# Patient Record
Sex: FEMALE | Race: WHITE | NOT HISPANIC OR LATINO
[De-identification: names, ages, dates, MRNs, and addresses within clinical notes are randomized per-mention and may not be internally consistent; named-entity substitution may affect disease eponyms.]

---

## 2017-01-10 ENCOUNTER — TRANSCRIPTION ENCOUNTER (OUTPATIENT)
Age: 37
End: 2017-01-10

## 2017-03-15 ENCOUNTER — TRANSCRIPTION ENCOUNTER (OUTPATIENT)
Age: 37
End: 2017-03-15

## 2017-04-24 ENCOUNTER — APPOINTMENT (OUTPATIENT)
Dept: FAMILY MEDICINE | Facility: CLINIC | Age: 37
End: 2017-04-24

## 2017-05-11 ENCOUNTER — NON-APPOINTMENT (OUTPATIENT)
Age: 37
End: 2017-05-11

## 2017-05-11 ENCOUNTER — LABORATORY RESULT (OUTPATIENT)
Age: 37
End: 2017-05-11

## 2017-05-11 ENCOUNTER — APPOINTMENT (OUTPATIENT)
Dept: FAMILY MEDICINE | Facility: CLINIC | Age: 37
End: 2017-05-11

## 2017-05-11 VITALS
BODY MASS INDEX: 16.93 KG/M2 | TEMPERATURE: 99.1 F | OXYGEN SATURATION: 99 % | HEIGHT: 59 IN | SYSTOLIC BLOOD PRESSURE: 112 MMHG | WEIGHT: 84 LBS | HEART RATE: 76 BPM | DIASTOLIC BLOOD PRESSURE: 70 MMHG

## 2017-05-11 DIAGNOSIS — I73.00 RAYNAUD'S SYNDROME W/OUT GANGRENE: ICD-10-CM

## 2017-05-11 DIAGNOSIS — R00.2 PALPITATIONS: ICD-10-CM

## 2017-05-11 DIAGNOSIS — R63.6 UNDERWEIGHT: ICD-10-CM

## 2017-05-11 DIAGNOSIS — Z87.19 PERSONAL HISTORY OF OTHER DISEASES OF THE DIGESTIVE SYSTEM: ICD-10-CM

## 2017-05-15 LAB
ALBUMIN SERPL ELPH-MCNC: 4.9 G/DL
ALP BLD-CCNC: 54 U/L
ALT SERPL-CCNC: 13 U/L
ANION GAP SERPL CALC-SCNC: 21 MMOL/L
AST SERPL-CCNC: 24 U/L
BASOPHILS # BLD AUTO: 0.04 K/UL
BASOPHILS NFR BLD AUTO: 0.6 %
BILIRUB SERPL-MCNC: 0.3 MG/DL
BUN SERPL-MCNC: 14 MG/DL
CALCIUM SERPL-MCNC: 9.6 MG/DL
CHLORIDE SERPL-SCNC: 104 MMOL/L
CO2 SERPL-SCNC: 18 MMOL/L
CREAT SERPL-MCNC: 0.68 MG/DL
EOSINOPHIL # BLD AUTO: 0.12 K/UL
EOSINOPHIL NFR BLD AUTO: 1.8 %
GLUCOSE SERPL-MCNC: 78 MG/DL
HCT VFR BLD CALC: 37.6 %
HGB BLD-MCNC: 11.7 G/DL
IMM GRANULOCYTES NFR BLD AUTO: 0.1 %
LYMPHOCYTES # BLD AUTO: 2.09 K/UL
LYMPHOCYTES NFR BLD AUTO: 30.7 %
MAN DIFF?: NORMAL
MCHC RBC-ENTMCNC: 25.2 PG
MCHC RBC-ENTMCNC: 31.1 GM/DL
MCV RBC AUTO: 80.9 FL
MONOCYTES # BLD AUTO: 0.56 K/UL
MONOCYTES NFR BLD AUTO: 8.2 %
NEUTROPHILS # BLD AUTO: 3.99 K/UL
NEUTROPHILS NFR BLD AUTO: 58.6 %
PLATELET # BLD AUTO: 406 K/UL
POTASSIUM SERPL-SCNC: 4.1 MMOL/L
PROT SERPL-MCNC: 7.9 G/DL
RBC # BLD: 4.65 M/UL
RBC # FLD: 17 %
SODIUM SERPL-SCNC: 143 MMOL/L
WBC # FLD AUTO: 6.81 K/UL

## 2017-05-23 PROBLEM — I73.00 RAYNAUD'S SYNDROME: Status: ACTIVE | Noted: 2017-05-23

## 2017-11-27 ENCOUNTER — EMERGENCY (EMERGENCY)
Facility: HOSPITAL | Age: 37
LOS: 0 days | Discharge: HOME | End: 2017-11-27
Attending: EMERGENCY MEDICINE | Admitting: EMERGENCY MEDICINE
Payer: COMMERCIAL

## 2017-11-27 VITALS
RESPIRATION RATE: 18 BRPM | HEART RATE: 87 BPM | OXYGEN SATURATION: 100 % | DIASTOLIC BLOOD PRESSURE: 65 MMHG | SYSTOLIC BLOOD PRESSURE: 121 MMHG | TEMPERATURE: 98 F

## 2017-11-27 VITALS
SYSTOLIC BLOOD PRESSURE: 104 MMHG | DIASTOLIC BLOOD PRESSURE: 64 MMHG | RESPIRATION RATE: 16 BRPM | OXYGEN SATURATION: 100 % | TEMPERATURE: 98 F | HEART RATE: 83 BPM

## 2017-11-27 DIAGNOSIS — M54.2 CERVICALGIA: ICD-10-CM

## 2017-11-27 DIAGNOSIS — R11.2 NAUSEA WITH VOMITING, UNSPECIFIED: ICD-10-CM

## 2017-11-27 DIAGNOSIS — R00.2 PALPITATIONS: ICD-10-CM

## 2017-11-27 LAB
ALBUMIN SERPL ELPH-MCNC: 4.2 G/DL — SIGNIFICANT CHANGE UP (ref 3.3–5)
ALP SERPL-CCNC: 50 U/L — SIGNIFICANT CHANGE UP (ref 40–120)
ALT FLD-CCNC: 23 U/L — SIGNIFICANT CHANGE UP (ref 12–78)
ANION GAP SERPL CALC-SCNC: 11 MMOL/L — SIGNIFICANT CHANGE UP (ref 5–17)
APPEARANCE UR: CLEAR — SIGNIFICANT CHANGE UP
AST SERPL-CCNC: 13 U/L — LOW (ref 15–37)
BACTERIA # UR AUTO: (no result)
BASOPHILS # BLD AUTO: 0.1 K/UL — SIGNIFICANT CHANGE UP (ref 0–0.2)
BASOPHILS NFR BLD AUTO: 0.7 % — SIGNIFICANT CHANGE UP (ref 0–2)
BILIRUB SERPL-MCNC: 0.4 MG/DL — SIGNIFICANT CHANGE UP (ref 0.2–1.2)
BILIRUB UR-MCNC: NEGATIVE — SIGNIFICANT CHANGE UP
BUN SERPL-MCNC: 6 MG/DL — LOW (ref 7–23)
CALCIUM SERPL-MCNC: 9.2 MG/DL — SIGNIFICANT CHANGE UP (ref 8.5–10.1)
CHLORIDE SERPL-SCNC: 105 MMOL/L — SIGNIFICANT CHANGE UP (ref 96–108)
CO2 SERPL-SCNC: 23 MMOL/L — SIGNIFICANT CHANGE UP (ref 22–31)
COLOR SPEC: YELLOW — SIGNIFICANT CHANGE UP
CREAT SERPL-MCNC: 0.59 MG/DL — SIGNIFICANT CHANGE UP (ref 0.5–1.3)
DIFF PNL FLD: (no result)
EOSINOPHIL # BLD AUTO: 0 K/UL — SIGNIFICANT CHANGE UP (ref 0–0.5)
EOSINOPHIL NFR BLD AUTO: 0 % — SIGNIFICANT CHANGE UP (ref 0–6)
EPI CELLS # UR: SIGNIFICANT CHANGE UP
GLUCOSE SERPL-MCNC: 99 MG/DL — SIGNIFICANT CHANGE UP (ref 70–99)
GLUCOSE UR QL: NEGATIVE MG/DL — SIGNIFICANT CHANGE UP
HCG SERPL-ACNC: <1 MIU/ML — SIGNIFICANT CHANGE UP
HCT VFR BLD CALC: 41.5 % — SIGNIFICANT CHANGE UP (ref 34.5–45)
HGB BLD-MCNC: 13.2 G/DL — SIGNIFICANT CHANGE UP (ref 11.5–15.5)
KETONES UR-MCNC: (no result)
LEUKOCYTE ESTERASE UR-ACNC: (no result)
LYMPHOCYTES # BLD AUTO: 17.4 % — SIGNIFICANT CHANGE UP (ref 13–44)
LYMPHOCYTES # BLD AUTO: 2.2 K/UL — SIGNIFICANT CHANGE UP (ref 1–3.3)
MCHC RBC-ENTMCNC: 27.2 PG — SIGNIFICANT CHANGE UP (ref 27–34)
MCHC RBC-ENTMCNC: 31.9 GM/DL — LOW (ref 32–36)
MCV RBC AUTO: 85.3 FL — SIGNIFICANT CHANGE UP (ref 80–100)
MONOCYTES # BLD AUTO: 0.8 K/UL — SIGNIFICANT CHANGE UP (ref 0–0.9)
MONOCYTES NFR BLD AUTO: 6.1 % — SIGNIFICANT CHANGE UP (ref 2–14)
NEUTROPHILS # BLD AUTO: 9.4 K/UL — HIGH (ref 1.8–7.4)
NEUTROPHILS NFR BLD AUTO: 75.8 % — SIGNIFICANT CHANGE UP (ref 43–77)
NITRITE UR-MCNC: NEGATIVE — SIGNIFICANT CHANGE UP
PH UR: 6.5 — SIGNIFICANT CHANGE UP (ref 5–8)
PLATELET # BLD AUTO: 334 K/UL — SIGNIFICANT CHANGE UP (ref 150–400)
POTASSIUM SERPL-MCNC: 3.5 MMOL/L — SIGNIFICANT CHANGE UP (ref 3.5–5.3)
POTASSIUM SERPL-SCNC: 3.5 MMOL/L — SIGNIFICANT CHANGE UP (ref 3.5–5.3)
PROT SERPL-MCNC: 8.2 GM/DL — SIGNIFICANT CHANGE UP (ref 6–8.3)
PROT UR-MCNC: NEGATIVE MG/DL — SIGNIFICANT CHANGE UP
RBC # BLD: 4.87 M/UL — SIGNIFICANT CHANGE UP (ref 3.8–5.2)
RBC # FLD: 14.5 % — SIGNIFICANT CHANGE UP (ref 10.3–14.5)
RBC CASTS # UR COMP ASSIST: (no result) /HPF (ref 0–4)
SODIUM SERPL-SCNC: 139 MMOL/L — SIGNIFICANT CHANGE UP (ref 135–145)
SP GR SPEC: 1 — LOW (ref 1.01–1.02)
TROPONIN I SERPL-MCNC: <0.015 NG/ML — SIGNIFICANT CHANGE UP (ref 0.01–0.04)
TSH SERPL-MCNC: 1.64 UU/ML — SIGNIFICANT CHANGE UP (ref 0.36–3.74)
UROBILINOGEN FLD QL: NEGATIVE MG/DL — SIGNIFICANT CHANGE UP
WBC # BLD: 12.4 K/UL — HIGH (ref 3.8–10.5)
WBC # FLD AUTO: 12.4 K/UL — HIGH (ref 3.8–10.5)
WBC UR QL: SIGNIFICANT CHANGE UP

## 2017-11-27 PROCEDURE — 71020: CPT | Mod: 26

## 2017-11-27 PROCEDURE — 70498 CT ANGIOGRAPHY NECK: CPT | Mod: 26

## 2017-11-27 PROCEDURE — 99285 EMERGENCY DEPT VISIT HI MDM: CPT

## 2017-11-27 PROCEDURE — 93010 ELECTROCARDIOGRAM REPORT: CPT

## 2017-11-27 RX ORDER — METHOCARBAMOL 500 MG/1
2 TABLET, FILM COATED ORAL
Qty: 40 | Refills: 0 | OUTPATIENT
Start: 2017-11-27

## 2017-11-27 RX ORDER — METHOCARBAMOL 500 MG/1
1500 TABLET, FILM COATED ORAL ONCE
Qty: 0 | Refills: 0 | Status: COMPLETED | OUTPATIENT
Start: 2017-11-27 | End: 2017-11-27

## 2017-11-27 RX ORDER — SODIUM CHLORIDE 9 MG/ML
2000 INJECTION INTRAMUSCULAR; INTRAVENOUS; SUBCUTANEOUS ONCE
Qty: 0 | Refills: 0 | Status: COMPLETED | OUTPATIENT
Start: 2017-11-27 | End: 2017-11-27

## 2017-11-27 RX ADMIN — METHOCARBAMOL 1500 MILLIGRAM(S): 500 TABLET, FILM COATED ORAL at 18:32

## 2017-11-27 RX ADMIN — SODIUM CHLORIDE 2000 MILLILITER(S): 9 INJECTION INTRAMUSCULAR; INTRAVENOUS; SUBCUTANEOUS at 18:33

## 2017-11-27 NOTE — ED STATDOCS - MUSCULOSKELETAL, MLM
range of motion is not limited and there is no muscle tenderness. +right posterior lateral neck tenderness to palpitation from the occiput to the base of the neck with mild muscle spasm

## 2017-11-27 NOTE — ED STATDOCS - OBJECTIVE STATEMENT
38 y/o female with no PMHx presents to the ED c/o neck pain, palpitations, shakes, diarrhea, heat flashes, dry mouth for 2 weeks. Pt c/o neck pain that hurts tremendously where she needs to vomit. Pt states she iced her neck and took Motrin which relieved her pain. A day after pt heard and felt a loud pop and was not doing anything extraneous during this time. Pt went to Chiropractor who states neck area is tight. Last night pt was shaking where pain has worsened, spasmin in neck radiating to back, palpitations, dry mouth, diarrhea. Neck pain radiates up to her head. Seasonal wheezing.  Denies change in vision, fever, frequent urination, painful urination.

## 2017-11-27 NOTE — ED STATDOCS - CHPI ED SYMPTOM POS
+neck pain +shakes +neck pain +palpitations  +shakes +diarrhea +heat flashes +dry mouth/PAIN/DIARRHEA

## 2017-11-27 NOTE — ED ADULT TRIAGE NOTE - CHIEF COMPLAINT QUOTE
Pt. to the Ed C/O Palpitations with neck and upper back muscle spams- Pt. denies CP, and SOB- Pt. also denies medical hx

## 2017-11-27 NOTE — ED STATDOCS - ATTENDING CONTRIBUTION TO CARE
I, Gagan Perez MD,  performed the initial face to face bedside interview with this patient regarding history of present illness, review of symptoms and relevant past medical, social and family history.  I completed an independent physical examination.  I was the initial provider who evaluated this patient. I have signed out the follow up of any pending tests (i.e. labs, radiological studies) to the ACP.  I have communicated the patient’s plan of care and disposition with the ACP.  The history, relevant review of systems, past medical and surgical history, medical decision making, and physical examination was documented by the scribe in my presence and I attest to the accuracy of the documentation.    I, Gagan Perez MD, personally saw the patient with ACP.  I have personally performed a face to face diagnostic evaluation on this patient.  I have reviewed the ACP note and agree with the history, exam, and plan of care, except as noted.

## 2017-11-27 NOTE — ED STATDOCS - PROGRESS NOTE DETAILS
SARAHY Allison:   Patient has been seen, evaluated and orders have been written by the attending in intake. Patient is stable.  I will follow up the results of orders written and I will continue to evaluate/observe the patient. Pt. endorsed to SARAHY Quintana.  CT Angio Neck pending.  Karime Allison PA-C Attending Chris: Labs and CT above, no acute pathology in neck vessels. Unlikely UTI as pt denies urinary sx. Pt reports feeling slightly better in regards to neck pain, likely muscular spasm as source of pain. GI sx possibly from VGE, pt instructed to maintain oral bhydration and f/u as otpt. Return precautions given

## 2018-02-07 ENCOUNTER — APPOINTMENT (OUTPATIENT)
Dept: NEUROLOGY | Facility: CLINIC | Age: 38
End: 2018-02-07
Payer: COMMERCIAL

## 2018-02-07 VITALS
HEIGHT: 59 IN | HEART RATE: 72 BPM | SYSTOLIC BLOOD PRESSURE: 110 MMHG | BODY MASS INDEX: 16.13 KG/M2 | WEIGHT: 80 LBS | DIASTOLIC BLOOD PRESSURE: 72 MMHG

## 2018-02-07 DIAGNOSIS — M54.2 CERVICALGIA: ICD-10-CM

## 2018-02-07 DIAGNOSIS — R25.1 TREMOR, UNSPECIFIED: ICD-10-CM

## 2018-02-07 PROCEDURE — 99204 OFFICE O/P NEW MOD 45 MIN: CPT

## 2018-02-07 RX ORDER — MELOXICAM 7.5 MG/1
7.5 TABLET ORAL
Qty: 30 | Refills: 0 | Status: DISCONTINUED | COMMUNITY
Start: 2017-11-30

## 2018-02-07 RX ORDER — TIZANIDINE 2 MG/1
2 TABLET ORAL
Qty: 60 | Refills: 0 | Status: DISCONTINUED | COMMUNITY
Start: 2017-11-30

## 2018-02-07 RX ORDER — METHOCARBAMOL 500 MG/1
500 TABLET, FILM COATED ORAL
Qty: 40 | Refills: 0 | Status: DISCONTINUED | COMMUNITY
Start: 2017-11-27

## 2018-03-22 ENCOUNTER — APPOINTMENT (OUTPATIENT)
Dept: NEUROLOGY | Facility: CLINIC | Age: 38
End: 2018-03-22

## 2018-08-10 ENCOUNTER — RECORD ABSTRACTING (OUTPATIENT)
Age: 38
End: 2018-08-10

## 2018-08-10 DIAGNOSIS — R70.0 ELEVATED ERYTHROCYTE SEDIMENTATION RATE: ICD-10-CM

## 2018-08-10 DIAGNOSIS — Z82.49 FAMILY HISTORY OF ISCHEMIC HEART DISEASE AND OTHER DISEASES OF THE CIRCULATORY SYSTEM: ICD-10-CM

## 2018-08-10 DIAGNOSIS — R79.89 OTHER SPECIFIED ABNORMAL FINDINGS OF BLOOD CHEMISTRY: ICD-10-CM

## 2018-08-10 DIAGNOSIS — L65.9 NONSCARRING HAIR LOSS, UNSPECIFIED: ICD-10-CM

## 2018-08-10 DIAGNOSIS — E61.1 IRON DEFICIENCY: ICD-10-CM

## 2018-08-10 DIAGNOSIS — R79.9 ABNORMAL FINDING OF BLOOD CHEMISTRY, UNSPECIFIED: ICD-10-CM

## 2018-08-10 DIAGNOSIS — N92.6 IRREGULAR MENSTRUATION, UNSPECIFIED: ICD-10-CM

## 2018-08-10 DIAGNOSIS — R22.0 LOCALIZED SWELLING, MASS AND LUMP, HEAD: ICD-10-CM

## 2018-08-10 DIAGNOSIS — Z80.8 FAMILY HISTORY OF MALIGNANT NEOPLASM OF OTHER ORGANS OR SYSTEMS: ICD-10-CM

## 2018-08-10 DIAGNOSIS — Z86.32 PERSONAL HISTORY OF GESTATIONAL DIABETES: ICD-10-CM

## 2018-08-10 DIAGNOSIS — N28.1 CYST OF KIDNEY, ACQUIRED: ICD-10-CM

## 2018-08-10 DIAGNOSIS — Z83.3 FAMILY HISTORY OF DIABETES MELLITUS: ICD-10-CM

## 2018-08-10 DIAGNOSIS — R25.3 FASCICULATION: ICD-10-CM

## 2018-08-10 DIAGNOSIS — Z80.0 FAMILY HISTORY OF MALIGNANT NEOPLASM OF DIGESTIVE ORGANS: ICD-10-CM

## 2018-08-10 DIAGNOSIS — D17.71 BENIGN LIPOMATOUS NEOPLASM OF KIDNEY: ICD-10-CM

## 2018-08-10 DIAGNOSIS — R63.4 ABNORMAL WEIGHT LOSS: ICD-10-CM

## 2018-09-11 ENCOUNTER — ASOB RESULT (OUTPATIENT)
Age: 38
End: 2018-09-11

## 2018-09-11 ENCOUNTER — APPOINTMENT (OUTPATIENT)
Dept: ANTEPARTUM | Facility: CLINIC | Age: 38
End: 2018-09-11
Payer: COMMERCIAL

## 2018-09-11 PROCEDURE — 76802 OB US < 14 WKS ADDL FETUS: CPT

## 2018-09-11 PROCEDURE — 76801 OB US < 14 WKS SINGLE FETUS: CPT

## 2018-09-11 PROCEDURE — 76817 TRANSVAGINAL US OBSTETRIC: CPT

## 2018-09-17 ENCOUNTER — APPOINTMENT (OUTPATIENT)
Dept: INTERNAL MEDICINE | Facility: CLINIC | Age: 38
End: 2018-09-17

## 2018-09-17 DIAGNOSIS — Z00.00 ENCOUNTER FOR GENERAL ADULT MEDICAL EXAMINATION W/OUT ABNORMAL FINDINGS: ICD-10-CM

## 2018-09-21 ENCOUNTER — APPOINTMENT (OUTPATIENT)
Dept: ANTEPARTUM | Facility: CLINIC | Age: 38
End: 2018-09-21
Payer: COMMERCIAL

## 2018-09-21 ENCOUNTER — ASOB RESULT (OUTPATIENT)
Age: 38
End: 2018-09-21

## 2018-09-21 PROCEDURE — 76801 OB US < 14 WKS SINGLE FETUS: CPT

## 2018-09-21 PROCEDURE — 76802 OB US < 14 WKS ADDL FETUS: CPT

## 2018-10-07 ENCOUNTER — EMERGENCY (EMERGENCY)
Facility: HOSPITAL | Age: 38
LOS: 0 days | Discharge: HOME | End: 2018-10-07
Admitting: EMERGENCY MEDICINE
Payer: COMMERCIAL

## 2018-10-07 DIAGNOSIS — O20.9 HEMORRHAGE IN EARLY PREGNANCY, UNSPECIFIED: ICD-10-CM

## 2018-10-07 DIAGNOSIS — Z3A.14 14 WEEKS GESTATION OF PREGNANCY: ICD-10-CM

## 2018-10-07 PROCEDURE — 76801 OB US < 14 WKS SINGLE FETUS: CPT | Mod: 26

## 2018-10-07 PROCEDURE — 99283 EMERGENCY DEPT VISIT LOW MDM: CPT

## 2018-10-07 PROCEDURE — 99285 EMERGENCY DEPT VISIT HI MDM: CPT

## 2018-10-09 ENCOUNTER — ASOB RESULT (OUTPATIENT)
Age: 38
End: 2018-10-09

## 2018-10-09 ENCOUNTER — APPOINTMENT (OUTPATIENT)
Dept: ANTEPARTUM | Facility: CLINIC | Age: 38
End: 2018-10-09
Payer: COMMERCIAL

## 2018-10-09 PROCEDURE — 76801 OB US < 14 WKS SINGLE FETUS: CPT

## 2018-10-09 PROCEDURE — 76802 OB US < 14 WKS ADDL FETUS: CPT

## 2018-10-19 ENCOUNTER — ASOB RESULT (OUTPATIENT)
Age: 38
End: 2018-10-19

## 2018-10-19 ENCOUNTER — APPOINTMENT (OUTPATIENT)
Dept: ANTEPARTUM | Facility: CLINIC | Age: 38
End: 2018-10-19
Payer: COMMERCIAL

## 2018-10-19 PROCEDURE — 99242 OFF/OP CONSLTJ NEW/EST SF 20: CPT | Mod: 25

## 2018-10-19 PROCEDURE — 76817 TRANSVAGINAL US OBSTETRIC: CPT | Mod: 59

## 2018-10-19 PROCEDURE — 76805 OB US >/= 14 WKS SNGL FETUS: CPT

## 2018-10-19 PROCEDURE — 76810 OB US >/= 14 WKS ADDL FETUS: CPT

## 2018-10-30 ENCOUNTER — APPOINTMENT (OUTPATIENT)
Dept: ANTEPARTUM | Facility: CLINIC | Age: 38
End: 2018-10-30
Payer: COMMERCIAL

## 2018-10-30 ENCOUNTER — ASOB RESULT (OUTPATIENT)
Age: 38
End: 2018-10-30

## 2018-10-30 PROCEDURE — 76817 TRANSVAGINAL US OBSTETRIC: CPT

## 2018-10-30 PROCEDURE — 76815 OB US LIMITED FETUS(S): CPT | Mod: 59

## 2018-11-28 ENCOUNTER — ASOB RESULT (OUTPATIENT)
Age: 38
End: 2018-11-28

## 2018-11-28 ENCOUNTER — APPOINTMENT (OUTPATIENT)
Dept: ANTEPARTUM | Facility: CLINIC | Age: 38
End: 2018-11-28
Payer: COMMERCIAL

## 2018-11-28 PROCEDURE — 76812 OB US DETAILED ADDL FETUS: CPT

## 2018-11-28 PROCEDURE — 76817 TRANSVAGINAL US OBSTETRIC: CPT

## 2018-11-28 PROCEDURE — 76811 OB US DETAILED SNGL FETUS: CPT

## 2019-01-07 ENCOUNTER — APPOINTMENT (OUTPATIENT)
Dept: ANTEPARTUM | Facility: CLINIC | Age: 39
End: 2019-01-07
Payer: COMMERCIAL

## 2019-01-07 ENCOUNTER — APPOINTMENT (OUTPATIENT)
Dept: ANTEPARTUM | Facility: CLINIC | Age: 39
End: 2019-01-07

## 2019-01-07 ENCOUNTER — APPOINTMENT (OUTPATIENT)
Dept: MATERNAL FETAL MEDICINE | Facility: CLINIC | Age: 39
End: 2019-01-07
Payer: COMMERCIAL

## 2019-01-07 ENCOUNTER — ASOB RESULT (OUTPATIENT)
Age: 39
End: 2019-01-07

## 2019-01-07 VITALS
SYSTOLIC BLOOD PRESSURE: 100 MMHG | DIASTOLIC BLOOD PRESSURE: 62 MMHG | HEART RATE: 84 BPM | HEIGHT: 59 IN | BODY MASS INDEX: 23.45 KG/M2 | WEIGHT: 116.31 LBS

## 2019-01-07 DIAGNOSIS — Z78.9 OTHER SPECIFIED HEALTH STATUS: ICD-10-CM

## 2019-01-07 PROCEDURE — 76816 OB US FOLLOW-UP PER FETUS: CPT | Mod: 59

## 2019-01-07 PROCEDURE — 99244 OFF/OP CNSLTJ NEW/EST MOD 40: CPT

## 2019-01-07 RX ORDER — FERROUS SULFATE 325(65) MG
325 (65 FE) TABLET ORAL
Refills: 0 | Status: ACTIVE | COMMUNITY

## 2019-01-07 NOTE — HISTORY OF PRESENT ILLNESS
[FreeTextEntry1] : Daisy presents today for evaluation of the twin gestation. She has a history of 4 prior c-sections.

## 2019-01-07 NOTE — DISCUSSION/SUMMARY
[FreeTextEntry1] : An MRI would be beneficial to further assess the risk of accreta. \par \par Daisy is aware of the risks of accreta, and the potential need for a hysterectomy.  She will discuss with you the options of the MRI, and prophylactic planned C-hyst, or what level of attempt to preserve the uterus are appropriate. \par \par We discussed the  potential benefit of ureteral stent placement as well as the potential of interventional radiation to place catheters to minimize bleeding, although that option os best used for an attempt to preserve the uterus.

## 2019-01-07 NOTE — DATA REVIEWED
[FreeTextEntry1] : Sonogram today shows the twins to be appropriate and concordant. The placenta has moves away from the internal os, and although still covering the lower segment, is no longer a placenta previa. \par \par There is no evidence of accreta with Doppler flow showing no clear penetration of vessels to the myometrium.

## 2019-01-09 ENCOUNTER — APPOINTMENT (OUTPATIENT)
Dept: PEDIATRIC CARDIOLOGY | Facility: CLINIC | Age: 39
End: 2019-01-09
Payer: COMMERCIAL

## 2019-01-09 DIAGNOSIS — O09.529 SUPERVISION OF ELDERLY MULTIGRAVIDA, UNSPECIFIED TRIMESTER: ICD-10-CM

## 2019-01-09 DIAGNOSIS — O35.9XX0 MATERNAL CARE FOR (SUSPECTED) FETAL ABNORMALITY AND DAMAGE, UNSPECIFIED, NOT APPLICABLE OR UNSPECIFIED: ICD-10-CM

## 2019-01-09 PROCEDURE — 99203 OFFICE O/P NEW LOW 30 MIN: CPT | Mod: 25

## 2019-01-09 PROCEDURE — 76827 ECHO EXAM OF FETAL HEART: CPT

## 2019-01-09 PROCEDURE — ZZZZZ: CPT

## 2019-01-09 PROCEDURE — 76825 ECHO EXAM OF FETAL HEART: CPT

## 2019-01-09 PROCEDURE — 76827 ECHO EXAM OF FETAL HEART: CPT | Mod: 59

## 2019-01-09 PROCEDURE — 93325 DOPPLER ECHO COLOR FLOW MAPG: CPT

## 2019-01-09 PROCEDURE — 93325 DOPPLER ECHO COLOR FLOW MAPG: CPT | Mod: 59

## 2019-01-25 ENCOUNTER — OUTPATIENT (OUTPATIENT)
Dept: INPATIENT UNIT | Facility: HOSPITAL | Age: 39
LOS: 1 days | Discharge: HOME | End: 2019-01-25

## 2019-01-25 LAB
APPEARANCE UR: CLEAR — SIGNIFICANT CHANGE UP
BILIRUB UR-MCNC: NEGATIVE — SIGNIFICANT CHANGE UP
COLOR SPEC: YELLOW — SIGNIFICANT CHANGE UP
DIFF PNL FLD: NEGATIVE — SIGNIFICANT CHANGE UP
FIBRONECTIN FETAL SPEC QL: NEGATIVE — SIGNIFICANT CHANGE UP
GLUCOSE UR QL: NEGATIVE MG/DL — SIGNIFICANT CHANGE UP
KETONES UR-MCNC: NEGATIVE — SIGNIFICANT CHANGE UP
LEUKOCYTE ESTERASE UR-ACNC: NEGATIVE — SIGNIFICANT CHANGE UP
NITRITE UR-MCNC: NEGATIVE — SIGNIFICANT CHANGE UP
PH UR: 7 — SIGNIFICANT CHANGE UP (ref 5–8)
PROT UR-MCNC: NEGATIVE MG/DL — SIGNIFICANT CHANGE UP
SP GR SPEC: 1 — LOW (ref 1.01–1.02)
UROBILINOGEN FLD QL: NEGATIVE MG/DL — SIGNIFICANT CHANGE UP

## 2019-01-25 RX ORDER — INDOMETHACIN 50 MG
50 CAPSULE ORAL ONCE
Qty: 0 | Refills: 0 | Status: COMPLETED | OUTPATIENT
Start: 2019-01-25 | End: 2019-01-25

## 2019-01-25 RX ORDER — SODIUM CHLORIDE 9 MG/ML
1000 INJECTION, SOLUTION INTRAVENOUS
Qty: 0 | Refills: 0 | Status: DISCONTINUED | OUTPATIENT
Start: 2019-01-25 | End: 2019-03-02

## 2019-01-25 RX ORDER — INDOMETHACIN 50 MG
25 CAPSULE ORAL EVERY 6 HOURS
Qty: 0 | Refills: 0 | Status: DISCONTINUED | OUTPATIENT
Start: 2019-01-25 | End: 2019-03-02

## 2019-01-25 RX ORDER — INDOMETHACIN 50 MG
25 CAPSULE ORAL
Qty: 4 | Refills: 0 | OUTPATIENT
Start: 2019-01-25 | End: 2019-01-25

## 2019-01-25 RX ADMIN — Medication 50 MILLIGRAM(S): at 14:25

## 2019-01-29 ENCOUNTER — ASOB RESULT (OUTPATIENT)
Age: 39
End: 2019-01-29

## 2019-01-29 ENCOUNTER — APPOINTMENT (OUTPATIENT)
Age: 39
End: 2019-01-29
Payer: COMMERCIAL

## 2019-01-29 PROCEDURE — 76816 OB US FOLLOW-UP PER FETUS: CPT

## 2019-01-30 DIAGNOSIS — O47.9 FALSE LABOR, UNSPECIFIED: ICD-10-CM

## 2019-02-04 ENCOUNTER — APPOINTMENT (OUTPATIENT)
Dept: ANTEPARTUM | Facility: CLINIC | Age: 39
End: 2019-02-04
Payer: COMMERCIAL

## 2019-02-04 ENCOUNTER — ASOB RESULT (OUTPATIENT)
Age: 39
End: 2019-02-04

## 2019-02-04 PROCEDURE — 76815 OB US LIMITED FETUS(S): CPT | Mod: 59

## 2019-02-04 PROCEDURE — 76819 FETAL BIOPHYS PROFIL W/O NST: CPT

## 2019-02-22 ENCOUNTER — ASOB RESULT (OUTPATIENT)
Age: 39
End: 2019-02-22

## 2019-02-22 ENCOUNTER — APPOINTMENT (OUTPATIENT)
Age: 39
End: 2019-02-22
Payer: COMMERCIAL

## 2019-02-22 PROCEDURE — 76819 FETAL BIOPHYS PROFIL W/O NST: CPT | Mod: 59

## 2019-02-22 PROCEDURE — 76816 OB US FOLLOW-UP PER FETUS: CPT | Mod: 59

## 2019-03-12 ENCOUNTER — APPOINTMENT (OUTPATIENT)
Dept: MATERNAL FETAL MEDICINE | Facility: CLINIC | Age: 39
End: 2019-03-12
Payer: COMMERCIAL

## 2019-03-12 ENCOUNTER — APPOINTMENT (OUTPATIENT)
Dept: ANTEPARTUM | Facility: CLINIC | Age: 39
End: 2019-03-12
Payer: COMMERCIAL

## 2019-03-12 ENCOUNTER — ASOB RESULT (OUTPATIENT)
Age: 39
End: 2019-03-12

## 2019-03-12 VITALS
WEIGHT: 134 LBS | HEIGHT: 59 IN | HEART RATE: 94 BPM | DIASTOLIC BLOOD PRESSURE: 60 MMHG | SYSTOLIC BLOOD PRESSURE: 100 MMHG | BODY MASS INDEX: 27.01 KG/M2

## 2019-03-12 DIAGNOSIS — O99.013 ANEMIA COMPLICATING PREGNANCY, THIRD TRIMESTER: ICD-10-CM

## 2019-03-12 DIAGNOSIS — Z3A.35 35 WEEKS GESTATION OF PREGNANCY: ICD-10-CM

## 2019-03-12 DIAGNOSIS — O30.049 TWIN PREGNANCY, DICHORIONIC/DIAMNIOTIC, UNSPECIFIED TRIMESTER: ICD-10-CM

## 2019-03-12 PROCEDURE — 76818 FETAL BIOPHYS PROFILE W/NST: CPT

## 2019-03-12 PROCEDURE — 76816 OB US FOLLOW-UP PER FETUS: CPT

## 2019-03-12 PROCEDURE — 99215 OFFICE O/P EST HI 40 MIN: CPT

## 2019-03-12 PROCEDURE — 76816 OB US FOLLOW-UP PER FETUS: CPT | Mod: 59

## 2019-03-12 NOTE — VITALS
[LMP (date): ___] : LMP was on [unfilled] [GA =___ Weeks] : which calculates to a GA of [unfilled] weeks [DL by LMP (date): ___] : The calculated DL by LMP is [unfilled] [By LMP] : this is the final DL [US Date: ___] : ultrasound performed on [unfilled]. [GA= ___ Weeks] : Results were GA of [unfilled] weeks [GA= ___ Days] : and [unfilled] day(s)

## 2019-03-12 NOTE — FAMILY HISTORY
[Reported Family History Of Birth Defects] : no congenital heart defects [Prosper-Sachs Carrier] : no Prosper-Sachs [Family History] : no mental retardation/autism [Reported Family History Of Genetic Disease] : no history of child defect in child of baby father

## 2019-03-13 PROBLEM — Z3A.35 35 WEEKS GESTATION OF PREGNANCY: Status: ACTIVE | Noted: 2019-03-13

## 2019-03-13 PROBLEM — O99.013 ANEMIA DURING PREGNANCY IN THIRD TRIMESTER: Status: ACTIVE | Noted: 2019-03-13

## 2019-03-13 PROBLEM — O30.049 DICHORIONIC DIAMNIOTIC TWIN PREGNANCY, ANTEPARTUM: Status: ACTIVE | Noted: 2018-11-28

## 2019-03-13 LAB — HBA1C MFR BLD HPLC: 5 %

## 2019-03-13 NOTE — DISCUSSION/SUMMARY
[FreeTextEntry1] : She is 35 weeks gestation by her last menstrual period dates.\par \par She came today for a follow up Maternal Fetal Medicine evaluation to discuss the timing of delivery. She has a dichorionic - diamniotic twin pregnancy that at this time is complicated by anemia and suspected gestational diabetes. The timing of delivery for Di - Di twin pregnancies with concurrent maternal comorbidities such as anemia and gestational diabetes; according to the American College of Obstetricians and Gynecologists Committee Opinion Number 560; is between 36 0/7 and 37 6/7 weeks of gestation to minimize the risk of stillbirth associated with Di - Di twin pregnancy. She was advised to continue weekly  fetal surveillance until she delivers. She was also advised to have a follow up ultrasound examination in approximately 1 week.\par \par She has a history of gestational diabetes with prior pregnancies. She declined to have screen testing for gestational diabetes during the current pregnancy. She has been following a low carbohydrate diet and doing self glucose testing. She did not bring a glucose diary for my review. She told me that her fasting glucose readings ranged from 80 - 95 and the one-hour postprandial glucose readings ranged between 120 and 165. \par \par Regarding her anemia during pregnancy, I advised her to continue taking her prenatal vitamin and the prescribed iron supplementation. She was advised to increase her vitamin C intake.  I told her that red meat, poultry, and fish are dietary sources of iron. I told her that anemia during pregnancy increases the risk of having a  delivery or a low birth weight baby.\par \par I told her that early term deliveries (37 0/7 - 38 6/7 weeks gestation) have been associated with an increased risk for  morbidity and NICU admission when compared to term deliveries (39 0/7 - 41 6/7 weeks gestation).  In one study from a population in [facility] published in MARINO 2013 early term deliveries had higher risks for hypoglycemia (4.9 vs. 2.5%), NICU admissions (8.8% vs.5.3%), need for respiratory support (2.0% vs. 1.1%), requirement for intravenous fluids (7.5% vs. 4.4%), treatment with antibiotics (2.6% vs. 1.6%), and need for mechanical ventilation or intubation (0.6% vs. 0.1%). Early term  deliveries increased the risk of NICU admission (12.2%) and morbidity (7.5%) when compared to term  deliveries. Early term vaginal deliveries had higher NICU admission rates when compared to term vaginal births (6.8% vs. 4.4%). Gestational age at birth was a strong predictor of  morbidity after adjusting for mode of delivery. \par

## 2019-03-21 ENCOUNTER — APPOINTMENT (OUTPATIENT)
Dept: ANTEPARTUM | Facility: CLINIC | Age: 39
End: 2019-03-21

## 2019-03-21 ENCOUNTER — ASOB RESULT (OUTPATIENT)
Age: 39
End: 2019-03-21

## 2019-03-21 ENCOUNTER — APPOINTMENT (OUTPATIENT)
Age: 39
End: 2019-03-21

## 2019-03-21 ENCOUNTER — APPOINTMENT (OUTPATIENT)
Dept: ANTEPARTUM | Facility: CLINIC | Age: 39
End: 2019-03-21
Payer: COMMERCIAL

## 2019-03-21 PROCEDURE — 76815 OB US LIMITED FETUS(S): CPT | Mod: 59

## 2019-03-21 PROCEDURE — 76818 FETAL BIOPHYS PROFILE W/NST: CPT

## 2019-03-25 ENCOUNTER — OUTPATIENT (OUTPATIENT)
Dept: OUTPATIENT SERVICES | Facility: HOSPITAL | Age: 39
LOS: 1 days | Discharge: HOME | End: 2019-03-25

## 2019-03-25 LAB
APPEARANCE UR: CLEAR — SIGNIFICANT CHANGE UP
BASOPHILS # BLD AUTO: 0.02 K/UL — SIGNIFICANT CHANGE UP (ref 0–0.2)
BASOPHILS NFR BLD AUTO: 0.2 % — SIGNIFICANT CHANGE UP (ref 0–2)
BILIRUB UR-MCNC: NEGATIVE — SIGNIFICANT CHANGE UP
BLD GP AB SCN SERPL QL: SIGNIFICANT CHANGE UP
COLOR SPEC: YELLOW — SIGNIFICANT CHANGE UP
DIFF PNL FLD: NEGATIVE — SIGNIFICANT CHANGE UP
EOSINOPHIL # BLD AUTO: 0.05 K/UL — SIGNIFICANT CHANGE UP (ref 0–0.5)
EOSINOPHIL NFR BLD AUTO: 0.6 % — SIGNIFICANT CHANGE UP (ref 0–6)
GLUCOSE UR QL: NEGATIVE MG/DL — SIGNIFICANT CHANGE UP
HCT VFR BLD CALC: 33.8 % — LOW (ref 34.5–45)
HGB BLD-MCNC: 11.7 G/DL — SIGNIFICANT CHANGE UP (ref 11.5–15.5)
IMM GRANULOCYTES NFR BLD AUTO: 0.7 % — SIGNIFICANT CHANGE UP (ref 0–1.5)
KETONES UR-MCNC: NEGATIVE — SIGNIFICANT CHANGE UP
LEUKOCYTE ESTERASE UR-ACNC: NEGATIVE — SIGNIFICANT CHANGE UP
LYMPHOCYTES # BLD AUTO: 1.12 K/UL — SIGNIFICANT CHANGE UP (ref 1–3.3)
LYMPHOCYTES # BLD AUTO: 13.8 % — SIGNIFICANT CHANGE UP (ref 13–44)
MCHC RBC-ENTMCNC: 31.6 PG — SIGNIFICANT CHANGE UP (ref 27–34)
MCHC RBC-ENTMCNC: 34.6 GM/DL — SIGNIFICANT CHANGE UP (ref 32–36)
MCV RBC AUTO: 91.4 FL — SIGNIFICANT CHANGE UP (ref 80–100)
MONOCYTES # BLD AUTO: 0.76 K/UL — SIGNIFICANT CHANGE UP (ref 0–0.9)
MONOCYTES NFR BLD AUTO: 9.3 % — SIGNIFICANT CHANGE UP (ref 2–14)
NEUTROPHILS # BLD AUTO: 6.12 K/UL — SIGNIFICANT CHANGE UP (ref 1.8–7.4)
NEUTROPHILS NFR BLD AUTO: 75.4 % — SIGNIFICANT CHANGE UP (ref 43–77)
NITRITE UR-MCNC: NEGATIVE — SIGNIFICANT CHANGE UP
NRBC # BLD: 0 /100 WBCS — SIGNIFICANT CHANGE UP (ref 0–0)
PH UR: 6 — SIGNIFICANT CHANGE UP (ref 5–8)
PLATELET # BLD AUTO: 207 K/UL — SIGNIFICANT CHANGE UP (ref 150–400)
PROT UR-MCNC: NEGATIVE MG/DL — SIGNIFICANT CHANGE UP
RBC # BLD: 3.7 M/UL — LOW (ref 3.8–5.2)
RBC # FLD: 17.2 % — HIGH (ref 10.3–14.5)
SP GR SPEC: 1 — LOW (ref 1.01–1.02)
TYPE + AB SCN PNL BLD: SIGNIFICANT CHANGE UP
UROBILINOGEN FLD QL: NEGATIVE MG/DL — SIGNIFICANT CHANGE UP
WBC # BLD: 8.13 K/UL — SIGNIFICANT CHANGE UP (ref 3.8–10.5)
WBC # FLD AUTO: 8.13 K/UL — SIGNIFICANT CHANGE UP (ref 3.8–10.5)

## 2019-03-26 ENCOUNTER — RESULT REVIEW (OUTPATIENT)
Age: 39
End: 2019-03-26

## 2019-03-26 ENCOUNTER — INPATIENT (INPATIENT)
Facility: HOSPITAL | Age: 39
LOS: 5 days | Discharge: HOME | End: 2019-04-01
Attending: OBSTETRICS & GYNECOLOGY | Admitting: OBSTETRICS & GYNECOLOGY
Payer: COMMERCIAL

## 2019-03-26 VITALS — WEIGHT: 127.87 LBS | HEIGHT: 60 IN

## 2019-03-26 LAB
ALBUMIN SERPL ELPH-MCNC: 1.9 G/DL — LOW (ref 3.3–5)
ALP SERPL-CCNC: 94 U/L — SIGNIFICANT CHANGE UP (ref 40–120)
ALT FLD-CCNC: 15 U/L — SIGNIFICANT CHANGE UP (ref 12–78)
ANION GAP SERPL CALC-SCNC: 8 MMOL/L — SIGNIFICANT CHANGE UP (ref 5–17)
AST SERPL-CCNC: 26 U/L — SIGNIFICANT CHANGE UP (ref 15–37)
BILIRUB SERPL-MCNC: 1 MG/DL — SIGNIFICANT CHANGE UP (ref 0.2–1.2)
BUN SERPL-MCNC: 9 MG/DL — SIGNIFICANT CHANGE UP (ref 7–23)
CALCIUM SERPL-MCNC: 7.3 MG/DL — LOW (ref 8.5–10.1)
CHLORIDE SERPL-SCNC: 112 MMOL/L — HIGH (ref 96–108)
CO2 SERPL-SCNC: 19 MMOL/L — LOW (ref 22–31)
CREAT SERPL-MCNC: 0.54 MG/DL — SIGNIFICANT CHANGE UP (ref 0.5–1.3)
GLUCOSE SERPL-MCNC: 142 MG/DL — HIGH (ref 70–99)
HCT VFR BLD CALC: 31.7 % — LOW (ref 34.5–45)
HCT VFR BLD CALC: 40.9 % — SIGNIFICANT CHANGE UP (ref 34.5–45)
HGB BLD-MCNC: 10.7 G/DL — LOW (ref 11.5–15.5)
HGB BLD-MCNC: 12.7 G/DL — SIGNIFICANT CHANGE UP (ref 11.5–15.5)
INR BLD: 1.1 RATIO — SIGNIFICANT CHANGE UP (ref 0.88–1.16)
MCHC RBC-ENTMCNC: 28 PG — SIGNIFICANT CHANGE UP (ref 27–34)
MCHC RBC-ENTMCNC: 31.1 GM/DL — LOW (ref 32–36)
MCV RBC AUTO: 90.1 FL — SIGNIFICANT CHANGE UP (ref 80–100)
NRBC # BLD: 0 /100 WBCS — SIGNIFICANT CHANGE UP (ref 0–0)
PLATELET # BLD AUTO: 152 K/UL — SIGNIFICANT CHANGE UP (ref 150–400)
POTASSIUM SERPL-MCNC: 5 MMOL/L — SIGNIFICANT CHANGE UP (ref 3.5–5.3)
POTASSIUM SERPL-SCNC: 5 MMOL/L — SIGNIFICANT CHANGE UP (ref 3.5–5.3)
PROT SERPL-MCNC: 4.3 GM/DL — LOW (ref 6–8.3)
PROTHROM AB SERPL-ACNC: 12.2 SEC — SIGNIFICANT CHANGE UP (ref 10–12.9)
RBC # BLD: 4.54 M/UL — SIGNIFICANT CHANGE UP (ref 3.8–5.2)
RBC # FLD: 15.1 % — HIGH (ref 10.3–14.5)
SODIUM SERPL-SCNC: 139 MMOL/L — SIGNIFICANT CHANGE UP (ref 135–145)
T PALLIDUM AB TITR SER: NEGATIVE — SIGNIFICANT CHANGE UP
WBC # BLD: 16.77 K/UL — HIGH (ref 3.8–10.5)
WBC # FLD AUTO: 16.77 K/UL — HIGH (ref 3.8–10.5)

## 2019-03-26 PROCEDURE — 88307 TISSUE EXAM BY PATHOLOGIST: CPT | Mod: 26

## 2019-03-26 RX ORDER — DIPHENHYDRAMINE HCL 50 MG
25 CAPSULE ORAL EVERY 6 HOURS
Qty: 0 | Refills: 0 | Status: DISCONTINUED | OUTPATIENT
Start: 2019-03-27 | End: 2019-03-30

## 2019-03-26 RX ORDER — FERROUS SULFATE 325(65) MG
325 TABLET ORAL DAILY
Qty: 0 | Refills: 0 | Status: DISCONTINUED | OUTPATIENT
Start: 2019-03-27 | End: 2019-04-01

## 2019-03-26 RX ORDER — SODIUM CHLORIDE 9 MG/ML
1000 INJECTION, SOLUTION INTRAVENOUS
Qty: 0 | Refills: 0 | Status: DISCONTINUED | OUTPATIENT
Start: 2019-03-26 | End: 2019-04-01

## 2019-03-26 RX ORDER — SODIUM CHLORIDE 9 MG/ML
1000 INJECTION, SOLUTION INTRAVENOUS ONCE
Qty: 0 | Refills: 0 | Status: DISCONTINUED | OUTPATIENT
Start: 2019-03-26 | End: 2019-04-01

## 2019-03-26 RX ORDER — METOCLOPRAMIDE HCL 10 MG
10 TABLET ORAL ONCE
Qty: 0 | Refills: 0 | Status: DISCONTINUED | OUTPATIENT
Start: 2019-03-26 | End: 2019-04-01

## 2019-03-26 RX ORDER — LANOLIN
1 OINTMENT (GRAM) TOPICAL
Qty: 0 | Refills: 0 | Status: DISCONTINUED | OUTPATIENT
Start: 2019-03-29 | End: 2019-04-01

## 2019-03-26 RX ORDER — SIMETHICONE 80 MG/1
80 TABLET, CHEWABLE ORAL EVERY 4 HOURS
Qty: 0 | Refills: 0 | Status: DISCONTINUED | OUTPATIENT
Start: 2019-03-27 | End: 2019-04-01

## 2019-03-26 RX ORDER — TRANEXAMIC ACID 100 MG/ML
885 INJECTION, SOLUTION INTRAVENOUS ONCE
Qty: 0 | Refills: 0 | Status: COMPLETED | OUTPATIENT
Start: 2019-03-26 | End: 2019-03-26

## 2019-03-26 RX ORDER — IBUPROFEN 200 MG
600 TABLET ORAL EVERY 6 HOURS
Qty: 0 | Refills: 0 | Status: DISCONTINUED | OUTPATIENT
Start: 2019-03-27 | End: 2019-04-01

## 2019-03-26 RX ORDER — OXYCODONE AND ACETAMINOPHEN 5; 325 MG/1; MG/1
1 TABLET ORAL
Qty: 0 | Refills: 0 | Status: DISCONTINUED | OUTPATIENT
Start: 2019-03-27 | End: 2019-04-01

## 2019-03-26 RX ORDER — DIPHENHYDRAMINE HCL 50 MG
25 CAPSULE ORAL
Qty: 0 | Refills: 0 | Status: DISCONTINUED | OUTPATIENT
Start: 2019-03-26 | End: 2019-03-30

## 2019-03-26 RX ORDER — ACETAMINOPHEN 500 MG
1000 TABLET ORAL ONCE
Qty: 0 | Refills: 0 | Status: DISCONTINUED | OUTPATIENT
Start: 2019-03-27 | End: 2019-04-01

## 2019-03-26 RX ORDER — OXYCODONE AND ACETAMINOPHEN 5; 325 MG/1; MG/1
2 TABLET ORAL EVERY 6 HOURS
Qty: 0 | Refills: 0 | Status: DISCONTINUED | OUTPATIENT
Start: 2019-03-27 | End: 2019-04-01

## 2019-03-26 RX ORDER — ONDANSETRON 8 MG/1
4 TABLET, FILM COATED ORAL EVERY 6 HOURS
Qty: 0 | Refills: 0 | Status: DISCONTINUED | OUTPATIENT
Start: 2019-03-27 | End: 2019-04-01

## 2019-03-26 RX ORDER — HYDROMORPHONE HYDROCHLORIDE 2 MG/ML
0.5 INJECTION INTRAMUSCULAR; INTRAVENOUS; SUBCUTANEOUS
Qty: 0 | Refills: 0 | Status: DISCONTINUED | OUTPATIENT
Start: 2019-03-26 | End: 2019-03-26

## 2019-03-26 RX ORDER — TETANUS TOXOID, REDUCED DIPHTHERIA TOXOID AND ACELLULAR PERTUSSIS VACCINE, ADSORBED 5; 2.5; 8; 8; 2.5 [IU]/.5ML; [IU]/.5ML; UG/.5ML; UG/.5ML; UG/.5ML
0.5 SUSPENSION INTRAMUSCULAR ONCE
Qty: 0 | Refills: 0 | Status: DISCONTINUED | OUTPATIENT
Start: 2019-03-29 | End: 2019-04-01

## 2019-03-26 RX ORDER — ONDANSETRON 8 MG/1
4 TABLET, FILM COATED ORAL ONCE
Qty: 0 | Refills: 0 | Status: DISCONTINUED | OUTPATIENT
Start: 2019-03-26 | End: 2019-03-26

## 2019-03-26 RX ORDER — GLYCERIN ADULT
1 SUPPOSITORY, RECTAL RECTAL AT BEDTIME
Qty: 0 | Refills: 0 | Status: DISCONTINUED | OUTPATIENT
Start: 2019-03-29 | End: 2019-04-01

## 2019-03-26 RX ORDER — OXYTOCIN 10 UNIT/ML
41.67 VIAL (ML) INJECTION
Qty: 20 | Refills: 0 | Status: DISCONTINUED | OUTPATIENT
Start: 2019-03-29 | End: 2019-04-01

## 2019-03-26 RX ORDER — DOCUSATE SODIUM 100 MG
100 CAPSULE ORAL
Qty: 0 | Refills: 0 | Status: DISCONTINUED | OUTPATIENT
Start: 2019-03-27 | End: 2019-04-01

## 2019-03-26 RX ORDER — ENOXAPARIN SODIUM 100 MG/ML
40 INJECTION SUBCUTANEOUS EVERY 24 HOURS
Qty: 0 | Refills: 0 | Status: DISCONTINUED | OUTPATIENT
Start: 2019-03-27 | End: 2019-04-01

## 2019-03-26 RX ORDER — MEPERIDINE HYDROCHLORIDE 50 MG/ML
12.5 INJECTION INTRAMUSCULAR; INTRAVENOUS; SUBCUTANEOUS
Qty: 0 | Refills: 0 | Status: DISCONTINUED | OUTPATIENT
Start: 2019-03-26 | End: 2019-03-26

## 2019-03-26 RX ORDER — SODIUM CHLORIDE 9 MG/ML
1000 INJECTION INTRAMUSCULAR; INTRAVENOUS; SUBCUTANEOUS
Qty: 0 | Refills: 0 | Status: DISCONTINUED | OUTPATIENT
Start: 2019-03-26 | End: 2019-03-26

## 2019-03-26 RX ORDER — NALOXONE HYDROCHLORIDE 4 MG/.1ML
0.1 SPRAY NASAL
Qty: 0 | Refills: 0 | Status: DISCONTINUED | OUTPATIENT
Start: 2019-03-27 | End: 2019-04-01

## 2019-03-26 RX ORDER — CITRIC ACID/SODIUM CITRATE 300-500 MG
30 SOLUTION, ORAL ORAL ONCE
Qty: 0 | Refills: 0 | Status: DISCONTINUED | OUTPATIENT
Start: 2019-03-26 | End: 2019-04-01

## 2019-03-26 RX ADMIN — SODIUM CHLORIDE 100 MILLILITER(S): 9 INJECTION INTRAMUSCULAR; INTRAVENOUS; SUBCUTANEOUS at 17:16

## 2019-03-26 RX ADMIN — SODIUM CHLORIDE 125 MILLILITER(S): 9 INJECTION, SOLUTION INTRAVENOUS at 19:30

## 2019-03-26 RX ADMIN — Medication 100 MILLIGRAM(S): at 21:05

## 2019-03-26 RX ADMIN — TRANEXAMIC ACID 217.7 MILLIGRAM(S): 100 INJECTION, SOLUTION INTRAVENOUS at 12:27

## 2019-03-26 NOTE — BRIEF OPERATIVE NOTE - NSICDXBRIEFPREOP_GEN_ALL_CORE_FT
PRE-OP DIAGNOSIS:  Grand multiparity 26-Mar-2019 15:41:26  Laron Noriega  Pelvic adhesions 26-Mar-2019 15:32:33  Laron Noriega  Atonic postpartum hemorrhage 26-Mar-2019 15:30:38  Laron Noriega

## 2019-03-26 NOTE — PROGRESS NOTE ADULT - SUBJECTIVE AND OBJECTIVE BOX
asked to see patient s/p c section with emergent hysterectomy for bleeding    HPI this patient is a 38 year old female without significant PMH who had a  for twins today.  Intraop had massive bleeding from uterus, required emergent hysterectomy with 2000 cc blood loss.  Patient was hemodynamically unstable during or however, was dry at end of case.  Patient now in pacu , bp stable, comfortable    PMH - hx.  multiple pregnancies            Hx. laparoscopic akua    ROS - incisional pain, no chest pain , no sob, comfortable           no cough, fever, chills           ros otherwise negative    PE asked to see patient s/p c section with emergent hysterectomy for bleeding    HPI this patient is a 38 year old female without significant PMH who had a  for twins today.  Intraop had massive bleeding from uterus, required emergent hysterectomy with 2000 cc blood loss.  Patient was hemodynamically unstable during or however, was dry at end of case.  Patient now in pacu , bp stable, comfortable    PMH - hx.  multiple pregnancies            Hx. laparoscopic akua            hx. gestational diabetes            IBD    ROS - incisional pain, no chest pain , no sob, comfortable           no cough, fever, chills           ros otherwise negative

## 2019-03-26 NOTE — BRIEF OPERATIVE NOTE - NSICDXBRIEFPOSTOP_GEN_ALL_CORE_FT
POST-OP DIAGNOSIS:  Grand multiparity 26-Mar-2019 15:41:39  Laron Noriega  Pelvic adhesions 26-Mar-2019 15:32:51  Laron Noriega  Atonic postpartum hemorrhage 26-Mar-2019 15:31:03  Laron Noriega

## 2019-03-26 NOTE — BRIEF OPERATIVE NOTE - NSICDXBRIEFPROCEDURE_GEN_ALL_CORE_FT
PROCEDURES:  Salpingectomy, bilateral 26-Mar-2019 15:43:04  Laron Noriega  Lysis of pelvic adhesions 26-Mar-2019 15:32:16  Laron Noriega   hysterectomy 26-Mar-2019 15:29:42  Laron Noriega

## 2019-03-26 NOTE — PATIENT PROFILE OB - ALERT: PERTINENT HISTORY
20 Week Level II Sonogram/BioPhysical Profile(s)/Non Invasive Prenatal Screen (NIPS)/Fetal Non-Stress Test (NST)/Follow up Sonogram for Growth/Ultra Screen at 12 Weeks/1st Trimester Sonogram

## 2019-03-26 NOTE — PATIENT PROFILE OB - NS PRO ABUSE SCREEN SUSPICION NEGLECT YN
Problem: Goal Outcome Summary  Goal: Goal Outcome Summary  Outcome: Declining  Patient on bedrest; pain in calf when turning leg slightly.  Pain managed with IV Dilaudid and ice.  Poor PO intake; emesis, food aversion, unable to tolerate PO narcotics.  VSS.  A/Ox4.  Dressing CDI.  CMS intact; R pedal pulse weak, Doppler needed.  Unable to void; straight cathed for 500 cc.         no

## 2019-03-27 LAB
ANION GAP SERPL CALC-SCNC: 8 MMOL/L — SIGNIFICANT CHANGE UP (ref 5–17)
BUN SERPL-MCNC: 7 MG/DL — SIGNIFICANT CHANGE UP (ref 7–23)
CALCIUM SERPL-MCNC: 7.6 MG/DL — LOW (ref 8.5–10.1)
CHLORIDE SERPL-SCNC: 106 MMOL/L — SIGNIFICANT CHANGE UP (ref 96–108)
CO2 SERPL-SCNC: 21 MMOL/L — LOW (ref 22–31)
CREAT SERPL-MCNC: 0.56 MG/DL — SIGNIFICANT CHANGE UP (ref 0.5–1.3)
GLUCOSE SERPL-MCNC: 102 MG/DL — HIGH (ref 70–99)
HCT VFR BLD CALC: 22.7 % — LOW (ref 34.5–45)
HGB BLD-MCNC: 10 G/DL — LOW (ref 11.5–15.5)
HGB BLD-MCNC: 8 G/DL — LOW (ref 11.5–15.5)
MCHC RBC-ENTMCNC: 29.6 PG — SIGNIFICANT CHANGE UP (ref 27–34)
MCHC RBC-ENTMCNC: 35.2 GM/DL — SIGNIFICANT CHANGE UP (ref 32–36)
MCV RBC AUTO: 84.1 FL — SIGNIFICANT CHANGE UP (ref 80–100)
NRBC # BLD: 0 /100 WBCS — SIGNIFICANT CHANGE UP (ref 0–0)
PLATELET # BLD AUTO: 184 K/UL — SIGNIFICANT CHANGE UP (ref 150–400)
POTASSIUM SERPL-MCNC: 4.2 MMOL/L — SIGNIFICANT CHANGE UP (ref 3.5–5.3)
POTASSIUM SERPL-SCNC: 4.2 MMOL/L — SIGNIFICANT CHANGE UP (ref 3.5–5.3)
RBC # BLD: 2.7 M/UL — LOW (ref 3.8–5.2)
RBC # FLD: 16.1 % — HIGH (ref 10.3–14.5)
SODIUM SERPL-SCNC: 135 MMOL/L — SIGNIFICANT CHANGE UP (ref 135–145)
WBC # BLD: 21.68 K/UL — HIGH (ref 3.8–10.5)
WBC # FLD AUTO: 21.68 K/UL — HIGH (ref 3.8–10.5)

## 2019-03-27 RX ORDER — OXYCODONE HYDROCHLORIDE 5 MG/1
10 TABLET ORAL
Qty: 0 | Refills: 0 | Status: DISCONTINUED | OUTPATIENT
Start: 2019-03-27 | End: 2019-03-27

## 2019-03-27 RX ORDER — OXYCODONE HYDROCHLORIDE 5 MG/1
5 TABLET ORAL
Qty: 0 | Refills: 0 | Status: DISCONTINUED | OUTPATIENT
Start: 2019-03-27 | End: 2019-03-27

## 2019-03-27 RX ORDER — OXYCODONE AND ACETAMINOPHEN 5; 325 MG/1; MG/1
1 TABLET ORAL EVERY 4 HOURS
Qty: 0 | Refills: 0 | Status: DISCONTINUED | OUTPATIENT
Start: 2019-03-27 | End: 2019-03-27

## 2019-03-27 RX ORDER — ACETAMINOPHEN 500 MG
1000 TABLET ORAL ONCE
Qty: 0 | Refills: 0 | Status: COMPLETED | OUTPATIENT
Start: 2019-03-27 | End: 2019-03-27

## 2019-03-27 RX ORDER — OXYCODONE AND ACETAMINOPHEN 5; 325 MG/1; MG/1
2 TABLET ORAL EVERY 4 HOURS
Qty: 0 | Refills: 0 | Status: DISCONTINUED | OUTPATIENT
Start: 2019-03-27 | End: 2019-03-27

## 2019-03-27 RX ADMIN — SODIUM CHLORIDE 125 MILLILITER(S): 9 INJECTION, SOLUTION INTRAVENOUS at 05:58

## 2019-03-27 RX ADMIN — Medication 1000 MILLIGRAM(S): at 09:30

## 2019-03-27 RX ADMIN — OXYCODONE AND ACETAMINOPHEN 1 TABLET(S): 5; 325 TABLET ORAL at 17:28

## 2019-03-27 RX ADMIN — Medication 400 MILLIGRAM(S): at 08:51

## 2019-03-27 RX ADMIN — ENOXAPARIN SODIUM 40 MILLIGRAM(S): 100 INJECTION SUBCUTANEOUS at 18:53

## 2019-03-27 RX ADMIN — Medication 25 MILLIGRAM(S): at 08:02

## 2019-03-27 RX ADMIN — Medication 100 MILLIGRAM(S): at 05:58

## 2019-03-27 RX ADMIN — SIMETHICONE 80 MILLIGRAM(S): 80 TABLET, CHEWABLE ORAL at 17:41

## 2019-03-27 RX ADMIN — SODIUM CHLORIDE 125 MILLILITER(S): 9 INJECTION, SOLUTION INTRAVENOUS at 12:06

## 2019-03-27 RX ADMIN — SODIUM CHLORIDE 125 MILLILITER(S): 9 INJECTION, SOLUTION INTRAVENOUS at 18:56

## 2019-03-27 RX ADMIN — Medication 600 MILLIGRAM(S): at 17:27

## 2019-03-27 NOTE — PROGRESS NOTE ADULT - SUBJECTIVE AND OBJECTIVE BOX
Events Overnight: hemodynamically stable , slight drift down in hgb                   HPI:       his patient is a 38 year old female without significant PMH who had a  for twins today.  Intraop had massive bleeding from uterus, required emergent hysterectomy with 2000 cc blood loss.  Patient was hemodynamically unstable during or however, was dry at end of case.  hgb stable, HR, bp ok, no evidence significant bleeding, c/o abdominal pain    PMH - hx.  multiple pregnancies            Hx. laparoscopic akua            hx. gestational diabetes            IBD     ROS - no headache, chest pain, sob, c/o inc abdominal pain, slight nausea      ros otherwise negative    MEDICATIONS  (STANDING):  citric acid/sodium citrate Solution 30 milliLiter(s) Oral once  lactated ringers Bolus 1000 milliLiter(s) IV Bolus once  lactated ringers. 1000 milliLiter(s) (125 mL/Hr) IV Continuous <Continuous>    MEDICATIONS  (PRN):  diphenhydrAMINE   Injectable 25 milliGRAM(s) IV Push every 15 minutes PRN Itching  metoclopramide Injectable 10 milliGRAM(s) IV Push once PRN Nausea and/or Vomiting  oxyCODONE    5 mG/acetaminophen 325 mG 1 Tablet(s) Oral every 4 hours PRN Moderate Pain (4 - 6)  oxyCODONE    5 mG/acetaminophen 325 mG 2 Tablet(s) Oral every 4 hours PRN Severe Pain (7 - 10)       Height (cm): 152.4 ( @ 11:37)  Weight (kg): 58.967 ( @ 12:02)  BMI (kg/m2): 25.4 ( @ 12:02)    ICU Vital Signs Last 24 Hrs  T(C): 36.9 (27 Mar 2019 09:00), Max: 37.1 (27 Mar 2019 05:00)  T(F): 98.5 (27 Mar 2019 09:00), Max: 98.7 (27 Mar 2019 05:00)  HR: 76 (27 Mar 2019 10:00) (54 - 110)  BP: 97/60 (27 Mar 2019 10:00) (84/51 - 157/77)  BP(mean): 69 (27 Mar 2019 10:00) (58 - 82)  ABP: --  ABP(mean): --  RR: 24 (27 Mar 2019 10:00) (13 - 33)  SpO2: 99% (27 Mar 2019 10:00) (97% - 100%)          I&O's Summary    26 Mar 2019 07:  -  27 Mar 2019 07:00  --------------------------------------------------------  IN: 9020.9 mL / OUT: 3800 mL / NET: 5220.9 mL    27 Mar 2019 07:  -  27 Mar 2019 11:00  --------------------------------------------------------  IN: 670 mL / OUT: 0 mL / NET: 670 mL        Physical Exam:     general - looks well     HEENT - nc/at,      neck - supple     lungs, dec bs at bases, breathing comfortable     cv rrr     abdomen - distended,some tenderness     ext warm, slight edema                             8.0    21.68 )-----------( 184      ( 27 Mar 2019 06:21 )             22.7           135  |  106  |  7   ----------------------------<  102<H>  4.2   |  21<L>  |  0.56    Ca    7.6<L>      27 Mar 2019 06:21    TPro  4.3<L>  /  Alb  1.9<L>  /  TBili  1.0  /  DBili  x   /  AST  26  /  ALT  15  /  AlkPhos  94        DVT Prophylaxis:   venodynes                                                              Advanced Directives: Bleeding

## 2019-03-27 NOTE — PROGRESS NOTE ADULT - SUBJECTIVE AND OBJECTIVE BOX
Postpartum Note,  Section  She is a  38y woman who is now post-operative day: 1 s/p repeat  section and  hysterectomy. Patient lost 2000 liters of blood hence was given 3 units of PPRBC, 1 unit of FFP, and 1 bag of platelets during the case, hence she was placed in ICU overnight for observation.    Subjective:  The patient c/o of pain. Burping but not passing flatus.  She is not ambulating.   She has not tried any foods yet except liquids  She denies nausea and vomiting.  Louise catheter draining clear urine  Her pain is controlled.  She reports normal postpartum bleeding.  She plans to breast feed.      Physical exam:    Vital Signs Last 24 Hrs  T(C): 37.1 (27 Mar 2019 05:00), Max: 37.1 (27 Mar 2019 05:00)  T(F): 98.7 (27 Mar 2019 05:00), Max: 98.7 (27 Mar 2019 05:00)  HR: 97 (27 Mar 2019 08:00) (54 - 110)  BP: 103/64 (27 Mar 2019 08:00) (84/51 - 157/77)  BP(mean): 72 (27 Mar 2019 08:00) (58 - 82)  RR: 26 (27 Mar 2019 08:00) (13 - 28)  SpO2: 100% (27 Mar 2019 08:00) (97% - 100%)    Gen: NAD  Breast: Soft, nontender, not engorged.  Abdomen: Soft,  but distended and tender on palpation. BS absent.  Incision: Clean, dry, and intact with steri strips  Pelvic: Normal lochia noted  Ext: No calf tenderness    LABS:                        8.0    21.68 )-----------( 184      ( 27 Mar 2019 06:21 )             22.7       Rubella status: immune    Allergies    morphine (Other)  Omnicef (Unknown)  penicillin G potassium (Other)    Intolerances      MEDICATIONS  (STANDING):  citric acid/sodium citrate Solution 30 milliLiter(s) Oral once  lactated ringers Bolus 1000 milliLiter(s) IV Bolus once  lactated ringers. 1000 milliLiter(s) (125 mL/Hr) IV Continuous <Continuous>    MEDICATIONS  (PRN):  diphenhydrAMINE   Injectable 25 milliGRAM(s) IV Push every 15 minutes PRN Itching  metoclopramide Injectable 10 milliGRAM(s) IV Push once PRN Nausea and/or Vomiting        Assessment and Plan  POD # s/p repeat  section,  hysterectomy, extensive lysis of adhesions and  hysterectomy.              Post op ileus developing  Plan: Discussed possibility of dropping an NGT but will give her a few hours to ambulate and rest bowels with just clear liquids and reassess in 6-8 hours           Stable enough to move down to maternity.

## 2019-03-28 LAB
HCT VFR BLD CALC: 19.2 % — CRITICAL LOW (ref 34.5–45)
HCT VFR BLD CALC: 20.6 % — CRITICAL LOW (ref 34.5–45)
HGB BLD-MCNC: 6.7 G/DL — CRITICAL LOW (ref 11.5–15.5)
HGB BLD-MCNC: 7.3 G/DL — LOW (ref 11.5–15.5)
MCHC RBC-ENTMCNC: 29.5 PG — SIGNIFICANT CHANGE UP (ref 27–34)
MCHC RBC-ENTMCNC: 30.3 PG — SIGNIFICANT CHANGE UP (ref 27–34)
MCHC RBC-ENTMCNC: 34.9 GM/DL — SIGNIFICANT CHANGE UP (ref 32–36)
MCHC RBC-ENTMCNC: 35.4 GM/DL — SIGNIFICANT CHANGE UP (ref 32–36)
MCV RBC AUTO: 84.6 FL — SIGNIFICANT CHANGE UP (ref 80–100)
MCV RBC AUTO: 85.5 FL — SIGNIFICANT CHANGE UP (ref 80–100)
NRBC # BLD: 0 /100 WBCS — SIGNIFICANT CHANGE UP (ref 0–0)
NRBC # BLD: 0 /100 WBCS — SIGNIFICANT CHANGE UP (ref 0–0)
PLATELET # BLD AUTO: 183 K/UL — SIGNIFICANT CHANGE UP (ref 150–400)
PLATELET # BLD AUTO: 202 K/UL — SIGNIFICANT CHANGE UP (ref 150–400)
RBC # BLD: 2.27 M/UL — LOW (ref 3.8–5.2)
RBC # BLD: 2.41 M/UL — LOW (ref 3.8–5.2)
RBC # FLD: 17.4 % — HIGH (ref 10.3–14.5)
RBC # FLD: 17.4 % — HIGH (ref 10.3–14.5)
WBC # BLD: 17.23 K/UL — HIGH (ref 3.8–10.5)
WBC # BLD: 17.39 K/UL — HIGH (ref 3.8–10.5)
WBC # FLD AUTO: 17.23 K/UL — HIGH (ref 3.8–10.5)
WBC # FLD AUTO: 17.39 K/UL — HIGH (ref 3.8–10.5)

## 2019-03-28 RX ADMIN — Medication 600 MILLIGRAM(S): at 13:58

## 2019-03-28 RX ADMIN — SIMETHICONE 80 MILLIGRAM(S): 80 TABLET, CHEWABLE ORAL at 11:53

## 2019-03-28 RX ADMIN — OXYCODONE AND ACETAMINOPHEN 1 TABLET(S): 5; 325 TABLET ORAL at 22:32

## 2019-03-28 RX ADMIN — Medication 600 MILLIGRAM(S): at 01:15

## 2019-03-28 RX ADMIN — OXYCODONE AND ACETAMINOPHEN 1 TABLET(S): 5; 325 TABLET ORAL at 05:00

## 2019-03-28 RX ADMIN — Medication 600 MILLIGRAM(S): at 00:15

## 2019-03-28 RX ADMIN — Medication 25 MILLIGRAM(S): at 00:13

## 2019-03-28 RX ADMIN — OXYCODONE AND ACETAMINOPHEN 1 TABLET(S): 5; 325 TABLET ORAL at 23:00

## 2019-03-28 RX ADMIN — OXYCODONE AND ACETAMINOPHEN 1 TABLET(S): 5; 325 TABLET ORAL at 00:18

## 2019-03-28 RX ADMIN — OXYCODONE AND ACETAMINOPHEN 1 TABLET(S): 5; 325 TABLET ORAL at 16:45

## 2019-03-28 RX ADMIN — Medication 100 MILLIGRAM(S): at 06:36

## 2019-03-28 RX ADMIN — Medication 100 MILLIGRAM(S): at 18:04

## 2019-03-28 RX ADMIN — ENOXAPARIN SODIUM 40 MILLIGRAM(S): 100 INJECTION SUBCUTANEOUS at 18:00

## 2019-03-28 RX ADMIN — Medication 600 MILLIGRAM(S): at 06:36

## 2019-03-28 RX ADMIN — SIMETHICONE 80 MILLIGRAM(S): 80 TABLET, CHEWABLE ORAL at 06:36

## 2019-03-28 RX ADMIN — OXYCODONE AND ACETAMINOPHEN 1 TABLET(S): 5; 325 TABLET ORAL at 01:15

## 2019-03-28 RX ADMIN — OXYCODONE AND ACETAMINOPHEN 1 TABLET(S): 5; 325 TABLET ORAL at 11:53

## 2019-03-28 RX ADMIN — Medication 25 MILLIGRAM(S): at 22:32

## 2019-03-28 RX ADMIN — Medication 600 MILLIGRAM(S): at 21:07

## 2019-03-28 RX ADMIN — Medication 600 MILLIGRAM(S): at 20:49

## 2019-03-28 NOTE — PROGRESS NOTE ADULT - SUBJECTIVE AND OBJECTIVE BOX
Postpartum Note,  Section  She is a  38y woman who is now post-operative day: 2    Subjective:  The patient feels better. no bleeding  She is ambulating. Not passinf flatus but b  She is tolerating clears.  She denies nausea and vomiting.  She still has vaughn which is draining well clear urine.  Her pain is controlled.  Denies any orthostatic symptoms  She is formula feeding.    Physical exam:    Vital Signs Last 24 Hrs  T(C): 37 (28 Mar 2019 07:45), Max: 37.5 (27 Mar 2019 17:34)  T(F): 98.6 (28 Mar 2019 07:45), Max: 99.5 (27 Mar 2019 17:34)  HR: 97 (28 Mar 2019 09:04) (67 - 97)  BP: 101/65 (28 Mar 2019 09:04) (97/52 - 110/64)  BP(mean): 63 (27 Mar 2019 12:00) (63 - 67)  RR: 16 (28 Mar 2019 07:45) (16 - 21)  SpO2: 99% (28 Mar 2019 08:54) (98% - 100%)    Gen: NAD  Breast: Soft, nontender, not engorged.  Abdomen: Soft, nontender, distention is less and BS heard on all 4 quadrants although a bit sluggish  Incision: Clean, dry, and intact with steri strips  Pelvic: Normal lochia noted  Ext: No calf tenderness    LABS:                        6.7    17.23 )-----------( 183      ( 28 Mar 2019 06:32 )             19.2       Rubella status: immune  Blood type : B+    Allergies    morphine (Other)  Omnicef (Unknown)  penicillin G potassium (Other)    Intolerances      MEDICATIONS  (STANDING):  acetaminophen  IVPB .. 1000 milliGRAM(s) IV Intermittent once  citric acid/sodium citrate Solution 30 milliLiter(s) Oral once  docusate sodium 100 milliGRAM(s) Oral two times a day  enoxaparin Injectable 40 milliGRAM(s) SubCutaneous every 24 hours  ferrous    sulfate 325 milliGRAM(s) Oral daily  ibuprofen  Tablet. 600 milliGRAM(s) Oral every 6 hours  lactated ringers Bolus 1000 milliLiter(s) IV Bolus once  lactated ringers. 1000 milliLiter(s) (125 mL/Hr) IV Continuous <Continuous>  prenatal multivitamin 1 Tablet(s) Oral daily    MEDICATIONS  (PRN):  diphenhydrAMINE 25 milliGRAM(s) Oral every 6 hours PRN Itching  diphenhydrAMINE   Injectable 25 milliGRAM(s) IV Push every 15 minutes PRN Itching  metoclopramide Injectable 10 milliGRAM(s) IV Push once PRN Nausea and/or Vomiting  naloxone Injectable 0.1 milliGRAM(s) IV Push every 3 minutes PRN For ANY of the following changes in patient status:  A. RR LESS THAN 10 breaths per minute, B. Oxygen saturation LESS THAN 90%, C. Sedation score of 6  ondansetron Injectable 4 milliGRAM(s) IV Push every 6 hours PRN Nausea  oxyCODONE    5 mG/acetaminophen 325 mG 1 Tablet(s) Oral every 3 hours PRN Moderate Pain (4 - 6)  oxyCODONE    5 mG/acetaminophen 325 mG 2 Tablet(s) Oral every 6 hours PRN Severe Pain (7 - 10)  simethicone 80 milliGRAM(s) Chew every 4 hours PRN Gas        Assessment and Plan:  POD # s/p Salpingectomy, bilateral  Bilateral salpingostomy  Lysis of pelvic adhesions   hysterectomy day 2    Doing well.  Will advance diet.   D/C vaughn catheter.  Encourage ambulation.   Circumcision today.

## 2019-03-29 ENCOUNTER — APPOINTMENT (OUTPATIENT)
Dept: ANTEPARTUM | Facility: CLINIC | Age: 39
End: 2019-03-29

## 2019-03-29 LAB
ANION GAP SERPL CALC-SCNC: 7 MMOL/L — SIGNIFICANT CHANGE UP (ref 5–17)
BUN SERPL-MCNC: 6 MG/DL — LOW (ref 7–23)
CALCIUM SERPL-MCNC: 7.9 MG/DL — LOW (ref 8.5–10.1)
CHLORIDE SERPL-SCNC: 104 MMOL/L — SIGNIFICANT CHANGE UP (ref 96–108)
CO2 SERPL-SCNC: 26 MMOL/L — SIGNIFICANT CHANGE UP (ref 22–31)
CREAT SERPL-MCNC: 0.44 MG/DL — LOW (ref 0.5–1.3)
GLUCOSE SERPL-MCNC: 100 MG/DL — HIGH (ref 70–99)
HCT VFR BLD CALC: 22.5 % — LOW (ref 34.5–45)
HGB BLD-MCNC: 7.6 G/DL — LOW (ref 11.5–15.5)
MCHC RBC-ENTMCNC: 29.6 PG — SIGNIFICANT CHANGE UP (ref 27–34)
MCHC RBC-ENTMCNC: 33.8 GM/DL — SIGNIFICANT CHANGE UP (ref 32–36)
MCV RBC AUTO: 87.5 FL — SIGNIFICANT CHANGE UP (ref 80–100)
NRBC # BLD: 0 /100 WBCS — SIGNIFICANT CHANGE UP (ref 0–0)
PLATELET # BLD AUTO: 272 K/UL — SIGNIFICANT CHANGE UP (ref 150–400)
POTASSIUM SERPL-MCNC: 3.3 MMOL/L — LOW (ref 3.5–5.3)
POTASSIUM SERPL-SCNC: 3.3 MMOL/L — LOW (ref 3.5–5.3)
RBC # BLD: 2.57 M/UL — LOW (ref 3.8–5.2)
RBC # FLD: 17.3 % — HIGH (ref 10.3–14.5)
SODIUM SERPL-SCNC: 137 MMOL/L — SIGNIFICANT CHANGE UP (ref 135–145)
WBC # BLD: 14.95 K/UL — HIGH (ref 3.8–10.5)
WBC # FLD AUTO: 14.95 K/UL — HIGH (ref 3.8–10.5)

## 2019-03-29 PROCEDURE — 74019 RADEX ABDOMEN 2 VIEWS: CPT | Mod: 26

## 2019-03-29 RX ORDER — DEXTROSE 50 % IN WATER 50 %
1000 SYRINGE (ML) INTRAVENOUS
Qty: 0 | Refills: 0 | Status: COMPLETED | OUTPATIENT
Start: 2019-03-29 | End: 2019-03-29

## 2019-03-29 RX ORDER — DEXTROSE 50 % IN WATER 50 %
1000 SYRINGE (ML) INTRAVENOUS
Qty: 0 | Refills: 0 | Status: DISCONTINUED | OUTPATIENT
Start: 2019-03-29 | End: 2019-03-29

## 2019-03-29 RX ORDER — DEXTROSE 50 % IN WATER 50 %
1000 SYRINGE (ML) INTRAVENOUS
Qty: 0 | Refills: 0 | Status: DISCONTINUED | OUTPATIENT
Start: 2019-03-29 | End: 2019-04-01

## 2019-03-29 RX ADMIN — Medication 100 MILLIGRAM(S): at 07:23

## 2019-03-29 RX ADMIN — OXYCODONE AND ACETAMINOPHEN 1 TABLET(S): 5; 325 TABLET ORAL at 14:35

## 2019-03-29 RX ADMIN — Medication 600 MILLIGRAM(S): at 07:23

## 2019-03-29 RX ADMIN — Medication 600 MILLIGRAM(S): at 18:02

## 2019-03-29 RX ADMIN — Medication 100 MILLIGRAM(S): at 18:00

## 2019-03-29 RX ADMIN — ENOXAPARIN SODIUM 40 MILLIGRAM(S): 100 INJECTION SUBCUTANEOUS at 17:59

## 2019-03-29 RX ADMIN — SIMETHICONE 80 MILLIGRAM(S): 80 TABLET, CHEWABLE ORAL at 10:57

## 2019-03-29 RX ADMIN — Medication 600 MILLIGRAM(S): at 13:30

## 2019-03-29 RX ADMIN — Medication 600 MILLIGRAM(S): at 12:51

## 2019-03-29 RX ADMIN — Medication 125 MILLILITER(S): at 16:10

## 2019-03-29 NOTE — PROGRESS NOTE ADULT - SUBJECTIVE AND OBJECTIVE BOX
Postpartum Note,  Section  She is a  38y woman who is now post-operative day:  3    Subjective:  The patient is concerned because she hasn't passed flatus yet. She is burping.  She is ambulating.   She is tolerating soft diet.  She denies nausea and vomiting.  She is voiding.  Her pain is controlled.  She reports normal postpartum bleeding.        Physical exam:    Vital Signs Last 24 Hrs  T(C): 36.8 (29 Mar 2019 04:10), Max: 36.8 (29 Mar 2019 04:10)  T(F): 98.2 (29 Mar 2019 04:10), Max: 98.2 (29 Mar 2019 04:10)  HR: 72 (29 Mar 2019 04:10) (67 - 98)  BP: 100/66 (29 Mar 2019 04:10) (98/50 - 113/71)  BP(mean): --  RR: 16 (29 Mar 2019 04:10) (16 - 16)  SpO2: 100% (29 Mar 2019 04:10) (99% - 100%)    Gen: NAD  Breast: Soft, nontender, not engorged.  Abdomen: Slightly more distended today. BS present but sluggish.  Incision: Dressing clean and intact.  Pelvic: Normal lochia noted  Ext: No calf tenderness    LABS:                        7.3    17.39 )-----------( 202      ( 28 Mar 2019 11:17 )             20.6           Allergies    morphine (Other)  Omnicef (Unknown)  penicillin G potassium (Other)    Intolerances      MEDICATIONS  (STANDING):  acetaminophen  IVPB .. 1000 milliGRAM(s) IV Intermittent once  citric acid/sodium citrate Solution 30 milliLiter(s) Oral once  diphtheria/tetanus/pertussis (acellular) Vaccine (ADAcel) 0.5 milliLiter(s) IntraMuscular once  docusate sodium 100 milliGRAM(s) Oral two times a day  enoxaparin Injectable 40 milliGRAM(s) SubCutaneous every 24 hours  ferrous    sulfate 325 milliGRAM(s) Oral daily  ibuprofen  Tablet. 600 milliGRAM(s) Oral every 6 hours  lactated ringers Bolus 1000 milliLiter(s) IV Bolus once  lactated ringers. 1000 milliLiter(s) (125 mL/Hr) IV Continuous <Continuous>  oxytocin Infusion 41.667 milliUNIT(s)/Min (125 mL/Hr) IV Continuous <Continuous>  prenatal multivitamin 1 Tablet(s) Oral daily    MEDICATIONS  (PRN):  diphenhydrAMINE 25 milliGRAM(s) Oral every 6 hours PRN Itching  diphenhydrAMINE   Injectable 25 milliGRAM(s) IV Push every 15 minutes PRN Itching  glycerin Suppository - Adult 1 Suppository(s) Rectal at bedtime PRN Constipation  lanolin Ointment 1 Application(s) Topical every 3 hours PRN Sore Nipples  metoclopramide Injectable 10 milliGRAM(s) IV Push once PRN Nausea and/or Vomiting  naloxone Injectable 0.1 milliGRAM(s) IV Push every 3 minutes PRN For ANY of the following changes in patient status:  A. RR LESS THAN 10 breaths per minute, B. Oxygen saturation LESS THAN 90%, C. Sedation score of 6  ondansetron Injectable 4 milliGRAM(s) IV Push every 6 hours PRN Nausea  oxyCODONE    5 mG/acetaminophen 325 mG 1 Tablet(s) Oral every 3 hours PRN Moderate Pain (4 - 6)  oxyCODONE    5 mG/acetaminophen 325 mG 2 Tablet(s) Oral every 6 hours PRN Severe Pain (7 - 10)  simethicone 80 milliGRAM(s) Chew every 4 hours PRN Gas        Assessment and Plan  POD # 3 with ? mild ileus s/p Salpingectomy, bilateral  Bilateral salpingostomy  Lysis of pelvic adhesions   hysterectomy  Plan: Flat and upright of the abdomen today           NGT if ileus present.           Mylicon as needed.

## 2019-03-30 LAB
ANION GAP SERPL CALC-SCNC: 10 MMOL/L — SIGNIFICANT CHANGE UP (ref 5–17)
BUN SERPL-MCNC: 4 MG/DL — LOW (ref 7–23)
CALCIUM SERPL-MCNC: 7.6 MG/DL — LOW (ref 8.5–10.1)
CHLORIDE SERPL-SCNC: 110 MMOL/L — HIGH (ref 96–108)
CO2 SERPL-SCNC: 23 MMOL/L — SIGNIFICANT CHANGE UP (ref 22–31)
CREAT SERPL-MCNC: 0.3 MG/DL — LOW (ref 0.5–1.3)
GLUCOSE SERPL-MCNC: 76 MG/DL — SIGNIFICANT CHANGE UP (ref 70–99)
HCT VFR BLD CALC: 18.2 % — CRITICAL LOW (ref 34.5–45)
HCT VFR BLD CALC: 18.9 % — CRITICAL LOW (ref 34.5–45)
HGB BLD-MCNC: 6 G/DL — CRITICAL LOW (ref 11.5–15.5)
HGB BLD-MCNC: 6.2 G/DL — CRITICAL LOW (ref 11.5–15.5)
MCHC RBC-ENTMCNC: 28.7 PG — SIGNIFICANT CHANGE UP (ref 27–34)
MCHC RBC-ENTMCNC: 28.8 PG — SIGNIFICANT CHANGE UP (ref 27–34)
MCHC RBC-ENTMCNC: 32.8 GM/DL — SIGNIFICANT CHANGE UP (ref 32–36)
MCHC RBC-ENTMCNC: 33 GM/DL — SIGNIFICANT CHANGE UP (ref 32–36)
MCV RBC AUTO: 87.5 FL — SIGNIFICANT CHANGE UP (ref 80–100)
MCV RBC AUTO: 87.5 FL — SIGNIFICANT CHANGE UP (ref 80–100)
NRBC # BLD: 0 /100 WBCS — SIGNIFICANT CHANGE UP (ref 0–0)
NRBC # BLD: 0 /100 WBCS — SIGNIFICANT CHANGE UP (ref 0–0)
PLATELET # BLD AUTO: 270 K/UL — SIGNIFICANT CHANGE UP (ref 150–400)
PLATELET # BLD AUTO: 291 K/UL — SIGNIFICANT CHANGE UP (ref 150–400)
POTASSIUM SERPL-MCNC: 3.4 MMOL/L — LOW (ref 3.5–5.3)
POTASSIUM SERPL-SCNC: 3.4 MMOL/L — LOW (ref 3.5–5.3)
RBC # BLD: 2.08 M/UL — LOW (ref 3.8–5.2)
RBC # BLD: 2.16 M/UL — LOW (ref 3.8–5.2)
RBC # FLD: 17.1 % — HIGH (ref 10.3–14.5)
RBC # FLD: 17.1 % — HIGH (ref 10.3–14.5)
SODIUM SERPL-SCNC: 143 MMOL/L — SIGNIFICANT CHANGE UP (ref 135–145)
WBC # BLD: 7.99 K/UL — SIGNIFICANT CHANGE UP (ref 3.8–10.5)
WBC # BLD: 8.85 K/UL — SIGNIFICANT CHANGE UP (ref 3.8–10.5)
WBC # FLD AUTO: 7.99 K/UL — SIGNIFICANT CHANGE UP (ref 3.8–10.5)
WBC # FLD AUTO: 8.85 K/UL — SIGNIFICANT CHANGE UP (ref 3.8–10.5)

## 2019-03-30 RX ORDER — POTASSIUM CHLORIDE 20 MEQ
20 PACKET (EA) ORAL ONCE
Qty: 0 | Refills: 0 | Status: COMPLETED | OUTPATIENT
Start: 2019-03-30 | End: 2019-03-30

## 2019-03-30 RX ORDER — SIMETHICONE 80 MG/1
80 TABLET, CHEWABLE ORAL
Qty: 0 | Refills: 0 | Status: DISCONTINUED | OUTPATIENT
Start: 2019-03-30 | End: 2019-03-30

## 2019-03-30 RX ADMIN — Medication 600 MILLIGRAM(S): at 07:55

## 2019-03-30 RX ADMIN — Medication 100 MILLIGRAM(S): at 07:11

## 2019-03-30 RX ADMIN — Medication 600 MILLIGRAM(S): at 20:30

## 2019-03-30 RX ADMIN — Medication 325 MILLIGRAM(S): at 14:04

## 2019-03-30 RX ADMIN — Medication 1 TABLET(S): at 14:05

## 2019-03-30 RX ADMIN — Medication 600 MILLIGRAM(S): at 07:11

## 2019-03-30 RX ADMIN — Medication 600 MILLIGRAM(S): at 02:19

## 2019-03-30 RX ADMIN — Medication 600 MILLIGRAM(S): at 20:05

## 2019-03-30 RX ADMIN — OXYCODONE AND ACETAMINOPHEN 1 TABLET(S): 5; 325 TABLET ORAL at 16:35

## 2019-03-30 RX ADMIN — Medication 20 MILLIEQUIVALENT(S): at 20:05

## 2019-03-30 RX ADMIN — Medication 100 MILLIGRAM(S): at 18:45

## 2019-03-30 RX ADMIN — Medication 600 MILLIGRAM(S): at 14:05

## 2019-03-30 RX ADMIN — ENOXAPARIN SODIUM 40 MILLIGRAM(S): 100 INJECTION SUBCUTANEOUS at 18:47

## 2019-03-30 RX ADMIN — SIMETHICONE 80 MILLIGRAM(S): 80 TABLET, CHEWABLE ORAL at 14:05

## 2019-03-30 RX ADMIN — Medication 600 MILLIGRAM(S): at 00:48

## 2019-03-30 NOTE — PROGRESS NOTE ADULT - SUBJECTIVE AND OBJECTIVE BOX
Postpartum Note,  Section  She is a  38y woman who is now post-operative day: 4    Subjective:  The patient feels well. Denies orthostatic symptoms  She is ambulating. Has not shpwered  She is tolerating clears. Asked for mylicon but not given due to possible ileus  She denies nausea and vomiting. Passed flatus  She is voiding.  Her pain is controlled.  She reports normal postpartum bleeding.    She is formula feeding.    Physical exam:    Vital Signs Last 24 Hrs  T(C): 37 (30 Mar 2019 07:30), Max: 37 (30 Mar 2019 07:30)  T(F): 98.6 (30 Mar 2019 07:30), Max: 98.6 (30 Mar 2019 07:30)  HR: 84 (30 Mar 2019 07:30) (72 - 84)  BP: 107/71 (30 Mar 2019 07:30) (103/65 - 133/63)  BP(mean): --  RR: 18 (30 Mar 2019 07:30) (17 - 18)  SpO2: 100% (30 Mar 2019 07:30) (99% - 100%)    Gen: NAD  Breast: Soft, nontender, not engorged.  Abdomen: Soft, , no distension , more active BS than yesterday  Incision: Clean, dry, and intact  Pelvic: Normal lochia noted  Ext: No calf tenderness    LABS:                        6.0    7.99  )-----------( 270      ( 30 Mar 2019 06:33 )             18.2       Rubella status: Immune    Allergies    morphine (Other)  Omnicef (Unknown)  penicillin G potassium (Other)    Intolerances      MEDICATIONS  (STANDING):  acetaminophen  IVPB .. 1000 milliGRAM(s) IV Intermittent once  citric acid/sodium citrate Solution 30 milliLiter(s) Oral once  dextrose 5% with potassium chloride 40 mEq/L 1000 milliLiter(s) (75 mL/Hr) IV Continuous <Continuous>  diphtheria/tetanus/pertussis (acellular) Vaccine (ADAcel) 0.5 milliLiter(s) IntraMuscular once  docusate sodium 100 milliGRAM(s) Oral two times a day  enoxaparin Injectable 40 milliGRAM(s) SubCutaneous every 24 hours  ferrous    sulfate 325 milliGRAM(s) Oral daily  ibuprofen  Tablet. 600 milliGRAM(s) Oral every 6 hours  lactated ringers Bolus 1000 milliLiter(s) IV Bolus once  lactated ringers. 1000 milliLiter(s) (125 mL/Hr) IV Continuous <Continuous>  oxytocin Infusion 41.667 milliUNIT(s)/Min (125 mL/Hr) IV Continuous <Continuous>  prenatal multivitamin 1 Tablet(s) Oral daily    MEDICATIONS  (PRN):  diphenhydrAMINE 25 milliGRAM(s) Oral every 6 hours PRN Itching  diphenhydrAMINE   Injectable 25 milliGRAM(s) IV Push every 15 minutes PRN Itching  glycerin Suppository - Adult 1 Suppository(s) Rectal at bedtime PRN Constipation  lanolin Ointment 1 Application(s) Topical every 3 hours PRN Sore Nipples  metoclopramide Injectable 10 milliGRAM(s) IV Push once PRN Nausea and/or Vomiting  naloxone Injectable 0.1 milliGRAM(s) IV Push every 3 minutes PRN For ANY of the following changes in patient status:  A. RR LESS THAN 10 breaths per minute, B. Oxygen saturation LESS THAN 90%, C. Sedation score of 6  ondansetron Injectable 4 milliGRAM(s) IV Push every 6 hours PRN Nausea  oxyCODONE    5 mG/acetaminophen 325 mG 1 Tablet(s) Oral every 3 hours PRN Moderate Pain (4 - 6)  oxyCODONE    5 mG/acetaminophen 325 mG 2 Tablet(s) Oral every 6 hours PRN Severe Pain (7 - 10)  simethicone 80 milliGRAM(s) Chew every 4 hours PRN Gas        Assessment and Plan  POD # s/p Salpingectomy, bilateral day 4 with resolving ileus  Bilateral salpingostomy  Lysis of pelvic adhesions   hysterectomy    Doing well.  Encourage ambulation. Will advance diet and give mylicon

## 2019-03-31 LAB
ANION GAP SERPL CALC-SCNC: 7 MMOL/L — SIGNIFICANT CHANGE UP (ref 5–17)
BLD GP AB SCN SERPL QL: SIGNIFICANT CHANGE UP
BUN SERPL-MCNC: 4 MG/DL — LOW (ref 7–23)
CALCIUM SERPL-MCNC: 7.9 MG/DL — LOW (ref 8.5–10.1)
CHLORIDE SERPL-SCNC: 107 MMOL/L — SIGNIFICANT CHANGE UP (ref 96–108)
CO2 SERPL-SCNC: 25 MMOL/L — SIGNIFICANT CHANGE UP (ref 22–31)
CREAT SERPL-MCNC: 0.33 MG/DL — LOW (ref 0.5–1.3)
GLUCOSE SERPL-MCNC: 86 MG/DL — SIGNIFICANT CHANGE UP (ref 70–99)
HCT VFR BLD CALC: 20.3 % — CRITICAL LOW (ref 34.5–45)
HGB BLD-MCNC: 6.7 G/DL — CRITICAL LOW (ref 11.5–15.5)
MCHC RBC-ENTMCNC: 28.9 PG — SIGNIFICANT CHANGE UP (ref 27–34)
MCHC RBC-ENTMCNC: 33 GM/DL — SIGNIFICANT CHANGE UP (ref 32–36)
MCV RBC AUTO: 87.5 FL — SIGNIFICANT CHANGE UP (ref 80–100)
NRBC # BLD: 0 /100 WBCS — SIGNIFICANT CHANGE UP (ref 0–0)
PLATELET # BLD AUTO: 306 K/UL — SIGNIFICANT CHANGE UP (ref 150–400)
POTASSIUM SERPL-MCNC: 3.3 MMOL/L — LOW (ref 3.5–5.3)
POTASSIUM SERPL-SCNC: 3.3 MMOL/L — LOW (ref 3.5–5.3)
RBC # BLD: 2.32 M/UL — LOW (ref 3.8–5.2)
RBC # FLD: 16.7 % — HIGH (ref 10.3–14.5)
SODIUM SERPL-SCNC: 139 MMOL/L — SIGNIFICANT CHANGE UP (ref 135–145)
TYPE + AB SCN PNL BLD: SIGNIFICANT CHANGE UP
WBC # BLD: 7.58 K/UL — SIGNIFICANT CHANGE UP (ref 3.8–10.5)
WBC # FLD AUTO: 7.58 K/UL — SIGNIFICANT CHANGE UP (ref 3.8–10.5)

## 2019-03-31 PROCEDURE — 93010 ELECTROCARDIOGRAM REPORT: CPT

## 2019-03-31 RX ORDER — LANOLIN ALCOHOL/MO/W.PET/CERES
3 CREAM (GRAM) TOPICAL ONCE
Qty: 0 | Refills: 0 | Status: DISCONTINUED | OUTPATIENT
Start: 2019-03-31 | End: 2019-04-01

## 2019-03-31 RX ORDER — POTASSIUM CHLORIDE 20 MEQ
20 PACKET (EA) ORAL ONCE
Qty: 0 | Refills: 0 | Status: COMPLETED | OUTPATIENT
Start: 2019-03-31 | End: 2019-03-31

## 2019-03-31 RX ADMIN — Medication 600 MILLIGRAM(S): at 04:00

## 2019-03-31 RX ADMIN — Medication 100 MILLIGRAM(S): at 18:57

## 2019-03-31 RX ADMIN — Medication 325 MILLIGRAM(S): at 13:12

## 2019-03-31 RX ADMIN — Medication 20 MILLIEQUIVALENT(S): at 13:11

## 2019-03-31 RX ADMIN — Medication 600 MILLIGRAM(S): at 18:53

## 2019-03-31 RX ADMIN — OXYCODONE AND ACETAMINOPHEN 1 TABLET(S): 5; 325 TABLET ORAL at 10:02

## 2019-03-31 RX ADMIN — Medication 1 TABLET(S): at 13:12

## 2019-03-31 RX ADMIN — Medication 100 MILLIGRAM(S): at 06:45

## 2019-03-31 RX ADMIN — Medication 600 MILLIGRAM(S): at 13:12

## 2019-03-31 RX ADMIN — SIMETHICONE 80 MILLIGRAM(S): 80 TABLET, CHEWABLE ORAL at 13:12

## 2019-03-31 NOTE — CONSULT NOTE ADULT - ASSESSMENT
38F with Symptomatic Anemia, palpitations, IRBBB on EKG    Recommendations:  -2Units PRBC transfusions if patient continues to refuse, consider IV iron transfusion and Hematology cx  -continue po iron for now  -daily H&H to trend  -Cardiology Cx with Dr. Corey to eval EKG  -TFTs

## 2019-03-31 NOTE — CONSULT NOTE ADULT - SUBJECTIVE AND OBJECTIVE BOX
PCP: not in area    REQUESTING PHYSICIAN: Dr. Justice    REASON FOR CONSULT: abnormal ekg    CHIEF COMPLAINT: palpitations    HPI:  38F s/p  and hysterectomy after twin delivery, POD #5 c/o palpitations which started yesterday. EKG preformed with revealed IRBBB, which she has a hx of from 2017 (EKG on file). Noted to be anemia s/p  hysterectomy, patient offered transfusion, but would like to defer until tomorrow to decide.     PAST MEDICAL & SURGICAL HISTORY:  No pertinent past medical history    FAMILY HISTORY:    Social History: , lives with . non-smoker, no etoh, no drug use    Vital Signs Last 24 Hrs  T(C): 36.5 (31 Mar 2019 11:38), Max: 37.1 (31 Mar 2019 07:50)  T(F): 97.7 (31 Mar 2019 11:38), Max: 98.7 (31 Mar 2019 07:50)  HR: 61 (31 Mar 2019 11:38) (61 - 84)  BP: 117/60 (31 Mar 2019 11:38) (103/48 - 117/60)  BP(mean): --  RR: 18 (31 Mar 2019 11:38) (17 - 18)  SpO2: 100% (31 Mar 2019 11:38) (99% - 100%)    I&O's Summary      CAPILLARY BLOOD GLUCOSE        REVIEW OF SYSTEMS:    CONSTITUTIONAL: No weakness, fevers or chills  EYES/ENT: No visual changes;  No vertigo or throat pain   NECK: No pain or stiffness  RESPIRATORY: No cough, wheezing, hemoptysis; No shortness of breath  CARDIOVASCULAR: No chest pain. (+)  palpitations  GASTROINTESTINAL: No abdominal or epigastric pain. No nausea, vomiting, or hematemesis; No diarrhea or constipation. No melena or hematochezia.  GENITOURINARY: No dysuria, frequency or hematuria  NEUROLOGICAL: No numbness or weakness  SKIN: No itching, burning, rashes, or lesions   All other review of systems is negative unless indicated above            PHYSICAL EXAM:    Constitutional: NAD, awake and alert, well-developed  HEENT: PERR, EOMI, Normal Hearing, MMM  Neck: Soft and supple, No LAD, No JVD  Respiratory: Breath sounds are clear bilaterally, No wheezing, rales or rhonchi  Cardiovascular: S1 and S2, regular rate and rhythm, no Murmurs, gallops or rubs  Gastrointestinal: Bowel Sounds present, soft, nontender, nondistended, no guarding, no rebound  Extremities: No peripheral edema  Vascular: 2+ peripheral pulses  Neurological: A/O x 3, no focal deficits  Musculoskeletal: 5/5 strength b/l upper and lower extremities  Skin: No rashes. well healing abdominal incision      MEDICATIONS:  MEDICATIONS  (STANDING):  acetaminophen  IVPB .. 1000 milliGRAM(s) IV Intermittent once  citric acid/sodium citrate Solution 30 milliLiter(s) Oral once  dextrose 5% with potassium chloride 40 mEq/L 1000 milliLiter(s) (75 mL/Hr) IV Continuous <Continuous>  diphtheria/tetanus/pertussis (acellular) Vaccine (ADAcel) 0.5 milliLiter(s) IntraMuscular once  docusate sodium 100 milliGRAM(s) Oral two times a day  enoxaparin Injectable 40 milliGRAM(s) SubCutaneous every 24 hours  ferrous    sulfate 325 milliGRAM(s) Oral daily  ibuprofen  Tablet. 600 milliGRAM(s) Oral every 6 hours  lactated ringers Bolus 1000 milliLiter(s) IV Bolus once  lactated ringers. 1000 milliLiter(s) (125 mL/Hr) IV Continuous <Continuous>  oxytocin Infusion 41.667 milliUNIT(s)/Min (125 mL/Hr) IV Continuous <Continuous>  prenatal multivitamin 1 Tablet(s) Oral daily      LABS: All Labs Reviewed:                        6.7    7.58  )-----------( 306      ( 31 Mar 2019 07:02 )             20.3     03-31    139  |  107  |  4<L>  ----------------------------<  86  3.3<L>   |  25  |  0.33<L>    Ca    7.9<L>      31 Mar 2019 07:02        RADIOLOGY/EKG: IRBBB, HR 60s

## 2019-03-31 NOTE — PROGRESS NOTE ADULT - SUBJECTIVE AND OBJECTIVE BOX
Postpartum Note,  Section  Patient is a 38y woman who is now postoperative day 5.    Subjective: Patient seen and examined at bedside. Patient reports feeling better although reports a few episodes of palpitations which would last a few seconds as well as periodic lightheadedness/dizziness starting last night. Pain controlled with current pain regimen. She is ambulating as tolerated and tolerating PO diet/fluids. She reports discomfort in lower abdomen/incision site which is improving. She is voiding well and reports flatus and small BM today. Reports formula feeding. Denies fever, headache, changes in vision, nausea, vomiting.     Physical Exam:    Vital Signs Last 24 Hrs  T(C): 36.5 (31 Mar 2019 11:38), Max: 37.1 (31 Mar 2019 07:50)  T(F): 97.7 (31 Mar 2019 11:38), Max: 98.7 (31 Mar 2019 07:50)  HR: 61 (31 Mar 2019 11:38) (61 - 96)  BP: 117/60 (31 Mar 2019 11:38) (103/48 - 117/60)  BP(mean): --  RR: 18 (31 Mar 2019 11:38) (17 - 18)  SpO2: 100% (31 Mar 2019 11:38) (99% - 100%)    Gen: No acute distress  Breast: Soft, mildly tender and engorged bilaterally, no warmth or erythema  Cardio: S1, S2  Lungs: CTA B/L, no wheezing  Abdomen: Soft, no distension, increased bowel sounds in comparison to yesterday, mildly tender to palpation  Incision: Clean, dry, and intact  Extremities: No calf tenderness bilaterally    LABS:                        6.7    7.58  )-----------( 306      ( 31 Mar 2019 07:02 )             20.3         Allergies    morphine (Other)  Omnicef (Unknown)  penicillin G potassium (Other)    Intolerances      MEDICATIONS  (STANDING):  acetaminophen  IVPB .. 1000 milliGRAM(s) IV Intermittent once  citric acid/sodium citrate Solution 30 milliLiter(s) Oral once  dextrose 5% with potassium chloride 40 mEq/L 1000 milliLiter(s) (75 mL/Hr) IV Continuous <Continuous>  diphtheria/tetanus/pertussis (acellular) Vaccine (ADAcel) 0.5 milliLiter(s) IntraMuscular once  docusate sodium 100 milliGRAM(s) Oral two times a day  enoxaparin Injectable 40 milliGRAM(s) SubCutaneous every 24 hours  ferrous    sulfate 325 milliGRAM(s) Oral daily  ibuprofen  Tablet. 600 milliGRAM(s) Oral every 6 hours  lactated ringers Bolus 1000 milliLiter(s) IV Bolus once  lactated ringers. 1000 milliLiter(s) (125 mL/Hr) IV Continuous <Continuous>  oxytocin Infusion 41.667 milliUNIT(s)/Min (125 mL/Hr) IV Continuous <Continuous>  prenatal multivitamin 1 Tablet(s) Oral daily    MEDICATIONS  (PRN):  glycerin Suppository - Adult 1 Suppository(s) Rectal at bedtime PRN Constipation  lanolin Ointment 1 Application(s) Topical every 3 hours PRN Sore Nipples  metoclopramide Injectable 10 milliGRAM(s) IV Push once PRN Nausea and/or Vomiting  naloxone Injectable 0.1 milliGRAM(s) IV Push every 3 minutes PRN For ANY of the following changes in patient status:  A. RR LESS THAN 10 breaths per minute, B. Oxygen saturation LESS THAN 90%, C. Sedation score of 6  ondansetron Injectable 4 milliGRAM(s) IV Push every 6 hours PRN Nausea  oxyCODONE    5 mG/acetaminophen 325 mG 1 Tablet(s) Oral every 3 hours PRN Moderate Pain (4 - 6)  oxyCODONE    5 mG/acetaminophen 325 mG 2 Tablet(s) Oral every 6 hours PRN Severe Pain (7 - 10)  simethicone 80 milliGRAM(s) Chew every 4 hours PRN Gas

## 2019-04-01 ENCOUNTER — TRANSCRIPTION ENCOUNTER (OUTPATIENT)
Age: 39
End: 2019-04-01

## 2019-04-01 VITALS
DIASTOLIC BLOOD PRESSURE: 72 MMHG | RESPIRATION RATE: 18 BRPM | OXYGEN SATURATION: 100 % | SYSTOLIC BLOOD PRESSURE: 109 MMHG | TEMPERATURE: 98 F | HEART RATE: 70 BPM

## 2019-04-01 LAB
ANION GAP SERPL CALC-SCNC: 7 MMOL/L — SIGNIFICANT CHANGE UP (ref 5–17)
BUN SERPL-MCNC: 5 MG/DL — LOW (ref 7–23)
CALCIUM SERPL-MCNC: 8.1 MG/DL — LOW (ref 8.5–10.1)
CHLORIDE SERPL-SCNC: 109 MMOL/L — HIGH (ref 96–108)
CO2 SERPL-SCNC: 26 MMOL/L — SIGNIFICANT CHANGE UP (ref 22–31)
CREAT SERPL-MCNC: 0.39 MG/DL — LOW (ref 0.5–1.3)
GLUCOSE SERPL-MCNC: 91 MG/DL — SIGNIFICANT CHANGE UP (ref 70–99)
HCT VFR BLD CALC: 22.7 % — LOW (ref 34.5–45)
HGB BLD-MCNC: 7.4 G/DL — LOW (ref 11.5–15.5)
MCHC RBC-ENTMCNC: 28.9 PG — SIGNIFICANT CHANGE UP (ref 27–34)
MCHC RBC-ENTMCNC: 32.6 GM/DL — SIGNIFICANT CHANGE UP (ref 32–36)
MCV RBC AUTO: 88.7 FL — SIGNIFICANT CHANGE UP (ref 80–100)
NRBC # BLD: 0 /100 WBCS — SIGNIFICANT CHANGE UP (ref 0–0)
PLATELET # BLD AUTO: 414 K/UL — HIGH (ref 150–400)
POTASSIUM SERPL-MCNC: 4 MMOL/L — SIGNIFICANT CHANGE UP (ref 3.5–5.3)
POTASSIUM SERPL-SCNC: 4 MMOL/L — SIGNIFICANT CHANGE UP (ref 3.5–5.3)
RBC # BLD: 2.56 M/UL — LOW (ref 3.8–5.2)
RBC # FLD: 16.7 % — HIGH (ref 10.3–14.5)
SODIUM SERPL-SCNC: 142 MMOL/L — SIGNIFICANT CHANGE UP (ref 135–145)
WBC # BLD: 9.48 K/UL — SIGNIFICANT CHANGE UP (ref 3.8–10.5)
WBC # FLD AUTO: 9.48 K/UL — SIGNIFICANT CHANGE UP (ref 3.8–10.5)

## 2019-04-01 RX ADMIN — Medication 600 MILLIGRAM(S): at 06:37

## 2019-04-01 RX ADMIN — Medication 600 MILLIGRAM(S): at 12:49

## 2019-04-01 RX ADMIN — Medication 600 MILLIGRAM(S): at 01:00

## 2019-04-01 RX ADMIN — Medication 1 TABLET(S): at 12:49

## 2019-04-01 RX ADMIN — SIMETHICONE 80 MILLIGRAM(S): 80 TABLET, CHEWABLE ORAL at 00:12

## 2019-04-01 RX ADMIN — Medication 100 MILLIGRAM(S): at 06:36

## 2019-04-01 RX ADMIN — Medication 325 MILLIGRAM(S): at 12:49

## 2019-04-01 RX ADMIN — Medication 600 MILLIGRAM(S): at 00:12

## 2019-04-01 NOTE — DISCHARGE NOTE OB - OTHER SIGNIFICANT FINDINGS
Adhesions of omentum  and bladder over lower uterine segment, large window over the uterine scar, very thinned out lower uterine segment that remained atonic

## 2019-04-01 NOTE — DISCHARGE NOTE OB - PATIENT PORTAL LINK FT
You can access the [facility] Patient Portal, offered by [facility], by registering with the following website: [email or web address]

## 2019-04-01 NOTE — PROGRESS NOTE ADULT - SUBJECTIVE AND OBJECTIVE BOX
CC: planned /hysterectomy (31 Mar 2019 16:09)      HPI:   38F s/p  and hysterectomy after twin delivery, POD #5 c/o palpitations which started day prior. EKG preformed with revealed IRBBB, which she has a hx of from 2017 (EKG on file). Pt also report that  h/o palpitations in the past.   Noted to be anemia s/p  hysterectomy, patient offered transfusion, but declined.       INTERVAL HPI/ OVERNIGHT EVENTS: Chart reviewed, Pt was seen and examined, reports  no further episodes  of palpitations, no dizziness. Denies CP or SOB, states feels well. + Flatus, had BM x1 postop. Pt advised on oral hydration, stool softeners and iron supplementation.      Vital Signs Last 24 Hrs  T(C): 36.8 (2019 07:40), Max: 36.9 (31 Mar 2019 20:15)  T(F): 98.2 (2019 07:40), Max: 98.4 (31 Mar 2019 20:15)  HR: 70 (2019 07:40) (61 - 72)  BP: 109/72 (2019 07:40) (109/72 - 117/60)  RR: 18 (2019 07:40) (16 - 18)  SpO2: 100% (2019 07:40) (98% - 100%)      REVIEW OF SYSTEMS:  All other review of systems is negative unless indicated above.      PHYSICAL EXAM:  General: Well developed;  in no acute distress  Eyes: PERRLA, EOMI; conjunctiva and sclera clear  Head: Normocephalic; atraumatic  ENMT: No nasal discharge; airway clear  Neck: Supple; no JVD   Respiratory: Good air entry b/l. No wheezes, rales or rhonchi  Cardiovascular: Regular rate and rhythm. S1 and S2 Normal; No murmurs  Gastrointestinal: Soft non-tender, mildly distended; + bowel sounds  Genitourinary: No suprapubic tenderness.  Extremities: Normal range of motion, No cedema  Vascular: Peripheral pulses palpable 2+ bilaterally  Neurological: Alert and oriented x4, non focal   Skin: Warm and dry. No acute rash  Musculoskeletal: Normal muscle  tone, without deformities  Psychiatric: Cooperative and appropriate       LABS:                7.4    9.48  )-----------( 414      ( 2019 06:43 )             22.7     2019 06:43    142    |  109    |  5      ----------------------------<  91     4.0     |  26     |  0.39     Ca    8.1        2019 06:43          MEDICATIONS  (STANDING):  acetaminophen  IVPB .. 1000 milliGRAM(s) IV Intermittent once  citric acid/sodium citrate Solution 30 milliLiter(s) Oral once  dextrose 5% with potassium chloride 40 mEq/L 1000 milliLiter(s) (75 mL/Hr) IV Continuous <Continuous>  diphtheria/tetanus/pertussis (acellular) Vaccine (ADAcel) 0.5 milliLiter(s) IntraMuscular once  docusate sodium 100 milliGRAM(s) Oral two times a day  enoxaparin Injectable 40 milliGRAM(s) SubCutaneous every 24 hours  ferrous    sulfate 325 milliGRAM(s) Oral daily  ibuprofen  Tablet. 600 milliGRAM(s) Oral every 6 hours  lactated ringers Bolus 1000 milliLiter(s) IV Bolus once  lactated ringers. 1000 milliLiter(s) (125 mL/Hr) IV Continuous <Continuous>  melatonin 3 milliGRAM(s) Oral once  oxytocin Infusion 41.667 milliUNIT(s)/Min (125 mL/Hr) IV Continuous <Continuous>  prenatal multivitamin 1 Tablet(s) Oral daily    MEDICATIONS  (PRN):  glycerin Suppository - Adult 1 Suppository(s) Rectal at bedtime PRN Constipation  lanolin Ointment 1 Application(s) Topical every 3 hours PRN Sore Nipples  metoclopramide Injectable 10 milliGRAM(s) IV Push once PRN Nausea and/or Vomiting  naloxone Injectable 0.1 milliGRAM(s) IV Push every 3 minutes PRN For ANY of the following changes in patient status:  A. RR LESS THAN 10 breaths per minute, B. Oxygen saturation LESS THAN 90%, C. Sedation score of 6  ondansetron Injectable 4 milliGRAM(s) IV Push every 6 hours PRN Nausea  oxyCODONE    5 mG/acetaminophen 325 mG 1 Tablet(s) Oral every 3 hours PRN Moderate Pain (4 - 6)  oxyCODONE    5 mG/acetaminophen 325 mG 2 Tablet(s) Oral every 6 hours PRN Severe Pain (7 - 10)  simethicone 80 milliGRAM(s) Chew every 4 hours PRN Gas      RADIOLOGY & ADDITIONAL TESTS:  < from: Xray Abdomen 2 Views (19 @ 11:43) >  EXAM:  XR ABDOMEN 2V                            PROCEDURE DATE:  2019          INTERPRETATION:  XR ABDOMEN 2V    HISTORY:  Abnormal Bowel Sounds and generalized abdominal pain status   post hysterectomy    VIEWS:  Supine and erect        COMPARISON:  Abdominal x-ray 10/10/2014    Multiple gas-filled loops of small and large bowel without air-fluid   levels.    Mild fecal retention in the colon.    No mass effect or organomegaly.    No pathologic intra-abdominal calcifications.    No free air under the diaphragm.    IMPRESSION:    Nonobstructive bowel gas pattern. Gas-filled loops of small and large   bowel, likely postoperative ileus.

## 2019-04-01 NOTE — DISCHARGE NOTE OB - MATERIALS PROVIDED
Tdap Vaccination (VIS Pub Date: 2012)/Guide to Postpartum Care/[place]  Immunization Record/Back To Sleep Handout/Shaken Baby Prevention Handout/[place] Screening Program/Bottle Feeding Log/Vaccinations/[place] Hearing Screen Program

## 2019-04-01 NOTE — DISCHARGE NOTE OB - MEDICATION SUMMARY - MEDICATIONS TO STOP TAKING
I will STOP taking the medications listed below when I get home from the hospital:    Robaxin 500 mg oral tablet  -- 2 tab(s) by mouth 4 times a day   -- May cause drowsiness.  Alcohol may intensify this effect.  Use care when operating dangerous machinery.

## 2019-04-01 NOTE — DISCHARGE NOTE OB - CARE PLAN
Principal Discharge DX:	Previous  section complicating pregnancy, with delivery  Goal:	safe delivery for mom and babies  Assessment and plan of treatment:	Scheduled  section and preparation for a large amount of blood loss  Secondary Diagnosis:	Grand multiparity, with current pregnancy, delivered

## 2019-04-01 NOTE — PROGRESS NOTE ADULT - SUBJECTIVE AND OBJECTIVE BOX
Postpartum Note,  Section  She is a  38y woman who is now post-operative day: 6    Subjective:  The patient feels well. had some palpitations yesterday which has now dissipated  She is ambulating without difficulty.  She is tolerating PO. had a BM and passing flatus  She is voiding.  She denies nausea and vomiting.  Her pain is controlled.  She reports normal postpartum bleeding    She is formula feeding.    Physical exam:    Vital Signs Last 24 Hrs  T(C): 36.8 (2019 07:40), Max: 36.9 (31 Mar 2019 20:15)  T(F): 98.2 (2019 07:40), Max: 98.4 (31 Mar 2019 20:15)  HR: 70 (2019 07:40) (61 - 72)  BP: 109/72 (2019 07:40) (109/72 - 117/60)  BP(mean): --  RR: 18 (2019 07:40) (16 - 18)  SpO2: 100% (2019 07:40) (98% - 100%)    Gen: NAD  Breast: Soft, nontender, non engorged  Abdomen: Soft, nontender, no distension , firm uterine fundus at umbilicus.  Incision: Clean, dry, and intact with steri strips  Pelvic: Normal lochia noted  Ext: No calf tenderness    LABS:                        7.4    9.48  )-----------( 414      ( 2019 06:43 )             22.7     Antibody Screen: NEG ( @ 14:51)  blood type  Rubella status:     Allergies    morphine (Other)  Omnicef (Unknown)  penicillin G potassium (Other)    Intolerances      MEDICATIONS  (STANDING):  acetaminophen  IVPB .. 1000 milliGRAM(s) IV Intermittent once  citric acid/sodium citrate Solution 30 milliLiter(s) Oral once  dextrose 5% with potassium chloride 40 mEq/L 1000 milliLiter(s) (75 mL/Hr) IV Continuous <Continuous>  diphtheria/tetanus/pertussis (acellular) Vaccine (ADAcel) 0.5 milliLiter(s) IntraMuscular once  docusate sodium 100 milliGRAM(s) Oral two times a day  enoxaparin Injectable 40 milliGRAM(s) SubCutaneous every 24 hours  ferrous    sulfate 325 milliGRAM(s) Oral daily  ibuprofen  Tablet. 600 milliGRAM(s) Oral every 6 hours  lactated ringers Bolus 1000 milliLiter(s) IV Bolus once  lactated ringers. 1000 milliLiter(s) (125 mL/Hr) IV Continuous <Continuous>  melatonin 3 milliGRAM(s) Oral once  oxytocin Infusion 41.667 milliUNIT(s)/Min (125 mL/Hr) IV Continuous <Continuous>  prenatal multivitamin 1 Tablet(s) Oral daily    MEDICATIONS  (PRN):  glycerin Suppository - Adult 1 Suppository(s) Rectal at bedtime PRN Constipation  lanolin Ointment 1 Application(s) Topical every 3 hours PRN Sore Nipples  metoclopramide Injectable 10 milliGRAM(s) IV Push once PRN Nausea and/or Vomiting  naloxone Injectable 0.1 milliGRAM(s) IV Push every 3 minutes PRN For ANY of the following changes in patient status:  A. RR LESS THAN 10 breaths per minute, B. Oxygen saturation LESS THAN 90%, C. Sedation score of 6  ondansetron Injectable 4 milliGRAM(s) IV Push every 6 hours PRN Nausea  oxyCODONE    5 mG/acetaminophen 325 mG 1 Tablet(s) Oral every 3 hours PRN Moderate Pain (4 - 6)  oxyCODONE    5 mG/acetaminophen 325 mG 2 Tablet(s) Oral every 6 hours PRN Severe Pain (7 - 10)  simethicone 80 milliGRAM(s) Chew every 4 hours PRN Gas        Assessment and Plan  POD # 6 s/p Salpingectomy, bilateral  Bilateral salpingostomy  Lysis of pelvic adhesions   hysterectomy. Clinically stable    Doing well.  Will advance to regular diet  Discharge home today.  Prescriptions for percocet and motrin electronically sent to the pharmacy.  F/U in 2 weeks for incision check.  Call for fevers, chills, nausea, vomiting, heavy vaginal bleeding, vaginal discharge, severe pain, symptoms of depression, problems with incision or any other concerning symptoms.  Nothing in vagina and no heavy lifting x 6 weeks.  No driving x 2 weeks.

## 2019-04-01 NOTE — DISCHARGE NOTE OB - PLAN OF CARE
safe delivery for mom and babies Scheduled  section and preparation for a large amount of blood loss

## 2019-04-01 NOTE — DISCHARGE NOTE OB - HOSPITAL COURSE
Patient came in for an elective repeat  section x5 with twin gestation at 37 weeks as was pre-counselled to the strong possibility of large blood loss the need for a  hysterectomy. Patient did have a hysterectomy when her lower uterine segment remained atonic. Twins came out without any issues both in the 7 lb. range but this patient suffered from sever postop anemia and ileus. She received the 3 units of PRBC from designated donors, 1 unit of FFP, and 1 unit of platelets and she declined a 4th unit because she did not have orthostatic symptoms or vitals. She turned the corner on post op day 5 with the ileus.

## 2019-04-01 NOTE — DISCHARGE NOTE OB - CARE PROVIDER_API CALL
Kingsley Justice)  Obstetrics and Gynecology  [address] [place] [address]  Phone: [phone]  Fax: [phone]  Follow Up Time: Kingsley Justice)  Obstetrics and Gynecology  [address] [place] [address]  Phone: [phone]  Fax: [phone]  Follow Up Time:

## 2019-04-01 NOTE — PROGRESS NOTE ADULT - REASON FOR ADMISSION
5 th repeat  section for twins
 section
for 5th repeat  section ,with twins
bleed post c section
elective 
bleeding s/p c section

## 2019-04-01 NOTE — PROGRESS NOTE ADULT - ASSESSMENT
38y woman s/p bilateral salpingectomy, lysis of pelvic adhesions and  hysterectomy, postoperative day 5, now with symptomatic anemia.    - EKG today showed abnormal findings, consulted and spoke with Internal Medicine Dr. D'Amico.  - Recommended blood transfusion with 2 units pRBCs but after discussing in length, patient refused and would like to be monitored and wait until tomorrow. Type and Screen performed today.  - Repleted potassium with KlorCon powder 20 mEq.  - Pain controlled, continue current pain regimen.  - Encouraged ambulation with care due to brief lightheadedness/dizziness.  - Otherwise routine post-op care.  - Encouraged PO soft diet/fluids, will advance as tolerated.
38 y.o postpartum female  with       1. Symptomatic Anemia, palpitations.   due to pregnancy related and blood loss anemia   Recommended 2U PRBCs, but Pt refused   Iron supplementation  Bowel regiment   Oral hydration   Monitor H/H       2. Palpitations, abnormal EKG   - not symptomatic, likely anemia related   - Has H/o IRBBB  - cardio eval if d/c consider outPt   - TSH WNL       3. Postop ileus   resolved   C/w Bowel regiment         4. DVT PPxs      Thank you for consult will follow.
Patient s/p c - section, wtih emergent hysterectomy with sign bleeding  post observed for concern over bleeding, hemodynamically stable  can transfer to maternity,  oob  some concern over abdominal pain, likely ileus and post op,  observe
s/p c section with significant bleeding intraop, will large blood loss  will monitor in icu overnight for evidence of bleeding  hgb 10 post op with bp 90, hr 80s  comfortable  will check f/u hgb  venodynes for dvt prophylaxis

## 2019-04-02 LAB — SURGICAL PATHOLOGY STUDY: SIGNIFICANT CHANGE UP

## 2019-04-04 DIAGNOSIS — O99.89 OTHER SPECIFIED DISEASES AND CONDITIONS COMPLICATING PREGNANCY, CHILDBIRTH AND THE PUERPERIUM: ICD-10-CM

## 2019-04-04 DIAGNOSIS — Z88.5 ALLERGY STATUS TO NARCOTIC AGENT: ICD-10-CM

## 2019-04-04 DIAGNOSIS — O09.43 SUPERVISION OF PREGNANCY WITH GRAND MULTIPARITY, THIRD TRIMESTER: ICD-10-CM

## 2019-04-04 DIAGNOSIS — N99.4 POSTPROCEDURAL PELVIC PERITONEAL ADHESIONS: ICD-10-CM

## 2019-04-04 DIAGNOSIS — Z3A.28 28 WEEKS GESTATION OF PREGNANCY: ICD-10-CM

## 2019-04-04 DIAGNOSIS — K56.7 ILEUS, UNSPECIFIED: ICD-10-CM

## 2019-04-04 DIAGNOSIS — O34.211 MATERNAL CARE FOR LOW TRANSVERSE SCAR FROM PREVIOUS CESAREAN DELIVERY: ICD-10-CM

## 2019-04-04 DIAGNOSIS — O30.043 TWIN PREGNANCY, DICHORIONIC/DIAMNIOTIC, THIRD TRIMESTER: ICD-10-CM

## 2019-04-04 DIAGNOSIS — Z88.0 ALLERGY STATUS TO PENICILLIN: ICD-10-CM

## 2019-04-04 DIAGNOSIS — Z88.8 ALLERGY STATUS TO OTHER DRUGS, MEDICAMENTS AND BIOLOGICAL SUBSTANCES: ICD-10-CM

## 2019-04-04 DIAGNOSIS — I45.19 OTHER RIGHT BUNDLE-BRANCH BLOCK: ICD-10-CM

## 2019-04-04 DIAGNOSIS — D62 ACUTE POSTHEMORRHAGIC ANEMIA: ICD-10-CM

## 2019-05-17 ENCOUNTER — TRANSCRIPTION ENCOUNTER (OUTPATIENT)
Age: 39
End: 2019-05-17

## 2019-09-02 ENCOUNTER — TRANSCRIPTION ENCOUNTER (OUTPATIENT)
Age: 39
End: 2019-09-02

## 2019-12-29 ENCOUNTER — TRANSCRIPTION ENCOUNTER (OUTPATIENT)
Age: 39
End: 2019-12-29

## 2020-01-12 ENCOUNTER — TRANSCRIPTION ENCOUNTER (OUTPATIENT)
Age: 40
End: 2020-01-12

## 2020-02-22 NOTE — PHYSICAL EXAM
From: Sarah Abhishek  To: Cristina Hammond MD  Sent: 2/22/2020 10:43 AM CST  Subject: Medication Question    This message is being sent by Gely Miller on behalf of Sarah Grace,    I just submitted a request for a refill for Sarah Weiss for Levothyroxine. In the past, you wanted her to have blood work done before you would refill this medication. If this is still the case, would you fax the paper work for this blood work over to [facility] to have this done. If you have any questions, please let me know. Thanks!    Armando Da Silva  [phone] (fax #)    Gely Paul - Daughter   [No Acute Distress] : no acute distress [Normal Sclera/Conjunctiva] : normal sclera/conjunctiva [Normal Outer Ear/Nose] : the outer ears and nose were normal in appearance [Thyroid Normal, No Nodules] : the thyroid was normal and there were no nodules present [Clear to Auscultation] : lungs were clear to auscultation bilaterally [Normal Rate] : normal rate  [Regular Rhythm] : with a regular rhythm [No Edema] : there was no peripheral edema [No Axillary Lymphadenopathy] : no axillary lymphadenopathy [No Masses] : no abdominal mass palpated [Normal Posterior Cervical Nodes] : no posterior cervical lymphadenopathy [Normal Anterior Cervical Nodes] : no anterior cervical lymphadenopathy [Kyphosis] : no kyphosis [Grossly Normal Strength/Tone] : grossly normal strength/tone [No Rash] : no rash [No Focal Deficits] : no focal deficits [Normal Affect] : the affect was normal

## 2020-08-14 ENCOUNTER — EMERGENCY (EMERGENCY)
Facility: HOSPITAL | Age: 40
LOS: 0 days | Discharge: HOME | End: 2020-08-14
Attending: EMERGENCY MEDICINE
Payer: COMMERCIAL

## 2020-08-14 VITALS
TEMPERATURE: 98 F | RESPIRATION RATE: 17 BRPM | SYSTOLIC BLOOD PRESSURE: 127 MMHG | DIASTOLIC BLOOD PRESSURE: 76 MMHG | OXYGEN SATURATION: 100 % | HEART RATE: 74 BPM

## 2020-08-14 VITALS — WEIGHT: 78.04 LBS

## 2020-08-14 DIAGNOSIS — Z88.5 ALLERGY STATUS TO NARCOTIC AGENT: ICD-10-CM

## 2020-08-14 DIAGNOSIS — R11.0 NAUSEA: ICD-10-CM

## 2020-08-14 DIAGNOSIS — N83.201 UNSPECIFIED OVARIAN CYST, RIGHT SIDE: ICD-10-CM

## 2020-08-14 DIAGNOSIS — R10.11 RIGHT UPPER QUADRANT PAIN: ICD-10-CM

## 2020-08-14 DIAGNOSIS — Z88.0 ALLERGY STATUS TO PENICILLIN: ICD-10-CM

## 2020-08-14 LAB
ALBUMIN SERPL ELPH-MCNC: 4.5 G/DL — SIGNIFICANT CHANGE UP (ref 3.3–5)
ALP SERPL-CCNC: 71 U/L — SIGNIFICANT CHANGE UP (ref 40–120)
ALT FLD-CCNC: 56 U/L — SIGNIFICANT CHANGE UP (ref 12–78)
ANION GAP SERPL CALC-SCNC: 6 MMOL/L — SIGNIFICANT CHANGE UP (ref 5–17)
APPEARANCE UR: CLEAR — SIGNIFICANT CHANGE UP
AST SERPL-CCNC: 29 U/L — SIGNIFICANT CHANGE UP (ref 15–37)
BASOPHILS # BLD AUTO: 0.03 K/UL — SIGNIFICANT CHANGE UP (ref 0–0.2)
BASOPHILS NFR BLD AUTO: 0.2 % — SIGNIFICANT CHANGE UP (ref 0–2)
BILIRUB SERPL-MCNC: 0.2 MG/DL — SIGNIFICANT CHANGE UP (ref 0.2–1.2)
BILIRUB UR-MCNC: NEGATIVE — SIGNIFICANT CHANGE UP
BUN SERPL-MCNC: 13 MG/DL — SIGNIFICANT CHANGE UP (ref 7–23)
CALCIUM SERPL-MCNC: 9.6 MG/DL — SIGNIFICANT CHANGE UP (ref 8.5–10.1)
CHLORIDE SERPL-SCNC: 105 MMOL/L — SIGNIFICANT CHANGE UP (ref 96–108)
CO2 SERPL-SCNC: 26 MMOL/L — SIGNIFICANT CHANGE UP (ref 22–31)
COLOR SPEC: YELLOW — SIGNIFICANT CHANGE UP
CREAT SERPL-MCNC: 0.83 MG/DL — SIGNIFICANT CHANGE UP (ref 0.5–1.3)
DIFF PNL FLD: ABNORMAL
EOSINOPHIL # BLD AUTO: 0.02 K/UL — SIGNIFICANT CHANGE UP (ref 0–0.5)
EOSINOPHIL NFR BLD AUTO: 0.2 % — SIGNIFICANT CHANGE UP (ref 0–6)
GLUCOSE SERPL-MCNC: 106 MG/DL — HIGH (ref 70–99)
GLUCOSE UR QL: NEGATIVE MG/DL — SIGNIFICANT CHANGE UP
HCG SERPL-ACNC: <1 MIU/ML — SIGNIFICANT CHANGE UP
HCT VFR BLD CALC: 46.3 % — HIGH (ref 34.5–45)
HGB BLD-MCNC: 15.1 G/DL — SIGNIFICANT CHANGE UP (ref 11.5–15.5)
IMM GRANULOCYTES NFR BLD AUTO: 0.2 % — SIGNIFICANT CHANGE UP (ref 0–1.5)
KETONES UR-MCNC: ABNORMAL
LACTATE SERPL-SCNC: 1.1 MMOL/L — SIGNIFICANT CHANGE UP (ref 0.7–2)
LEUKOCYTE ESTERASE UR-ACNC: NEGATIVE — SIGNIFICANT CHANGE UP
LIDOCAIN IGE QN: 179 U/L — SIGNIFICANT CHANGE UP (ref 73–393)
LYMPHOCYTES # BLD AUTO: 1.37 K/UL — SIGNIFICANT CHANGE UP (ref 1–3.3)
LYMPHOCYTES # BLD AUTO: 11 % — LOW (ref 13–44)
MCHC RBC-ENTMCNC: 29.3 PG — SIGNIFICANT CHANGE UP (ref 27–34)
MCHC RBC-ENTMCNC: 32.6 GM/DL — SIGNIFICANT CHANGE UP (ref 32–36)
MCV RBC AUTO: 89.9 FL — SIGNIFICANT CHANGE UP (ref 80–100)
MONOCYTES # BLD AUTO: 0.58 K/UL — SIGNIFICANT CHANGE UP (ref 0–0.9)
MONOCYTES NFR BLD AUTO: 4.6 % — SIGNIFICANT CHANGE UP (ref 2–14)
NEUTROPHILS # BLD AUTO: 10.47 K/UL — HIGH (ref 1.8–7.4)
NEUTROPHILS NFR BLD AUTO: 83.8 % — HIGH (ref 43–77)
NITRITE UR-MCNC: NEGATIVE — SIGNIFICANT CHANGE UP
PH UR: 5 — SIGNIFICANT CHANGE UP (ref 5–8)
PLATELET # BLD AUTO: 354 K/UL — SIGNIFICANT CHANGE UP (ref 150–400)
POTASSIUM SERPL-MCNC: 4.3 MMOL/L — SIGNIFICANT CHANGE UP (ref 3.5–5.3)
POTASSIUM SERPL-SCNC: 4.3 MMOL/L — SIGNIFICANT CHANGE UP (ref 3.5–5.3)
PROT SERPL-MCNC: 8.6 GM/DL — HIGH (ref 6–8.3)
PROT UR-MCNC: NEGATIVE MG/DL — SIGNIFICANT CHANGE UP
RBC # BLD: 5.15 M/UL — SIGNIFICANT CHANGE UP (ref 3.8–5.2)
RBC # FLD: 12.5 % — SIGNIFICANT CHANGE UP (ref 10.3–14.5)
SODIUM SERPL-SCNC: 137 MMOL/L — SIGNIFICANT CHANGE UP (ref 135–145)
SP GR SPEC: 1.02 — SIGNIFICANT CHANGE UP (ref 1.01–1.02)
UROBILINOGEN FLD QL: NEGATIVE MG/DL — SIGNIFICANT CHANGE UP
WBC # BLD: 12.5 K/UL — HIGH (ref 3.8–10.5)
WBC # FLD AUTO: 12.5 K/UL — HIGH (ref 3.8–10.5)

## 2020-08-14 PROCEDURE — 99283 EMERGENCY DEPT VISIT LOW MDM: CPT

## 2020-08-14 PROCEDURE — 36415 COLL VENOUS BLD VENIPUNCTURE: CPT

## 2020-08-14 PROCEDURE — 76830 TRANSVAGINAL US NON-OB: CPT

## 2020-08-14 PROCEDURE — 83690 ASSAY OF LIPASE: CPT

## 2020-08-14 PROCEDURE — 74177 CT ABD & PELVIS W/CONTRAST: CPT | Mod: 26

## 2020-08-14 PROCEDURE — 85025 COMPLETE CBC W/AUTO DIFF WBC: CPT

## 2020-08-14 PROCEDURE — 87086 URINE CULTURE/COLONY COUNT: CPT

## 2020-08-14 PROCEDURE — 76830 TRANSVAGINAL US NON-OB: CPT | Mod: 26

## 2020-08-14 PROCEDURE — 83605 ASSAY OF LACTIC ACID: CPT

## 2020-08-14 PROCEDURE — 84702 CHORIONIC GONADOTROPIN TEST: CPT

## 2020-08-14 PROCEDURE — 74177 CT ABD & PELVIS W/CONTRAST: CPT

## 2020-08-14 PROCEDURE — 99284 EMERGENCY DEPT VISIT MOD MDM: CPT | Mod: 25

## 2020-08-14 PROCEDURE — 80053 COMPREHEN METABOLIC PANEL: CPT

## 2020-08-14 PROCEDURE — 99284 EMERGENCY DEPT VISIT MOD MDM: CPT

## 2020-08-14 PROCEDURE — 81001 URINALYSIS AUTO W/SCOPE: CPT

## 2020-08-14 RX ORDER — SODIUM CHLORIDE 9 MG/ML
1000 INJECTION INTRAMUSCULAR; INTRAVENOUS; SUBCUTANEOUS ONCE
Refills: 0 | Status: COMPLETED | OUTPATIENT
Start: 2020-08-14 | End: 2020-08-14

## 2020-08-14 RX ADMIN — SODIUM CHLORIDE 1000 MILLILITER(S): 9 INJECTION INTRAMUSCULAR; INTRAVENOUS; SUBCUTANEOUS at 18:20

## 2020-08-14 NOTE — ED ADULT NURSE NOTE - OBJECTIVE STATEMENT
Patient comes to ED for RLQ pain that began 3 days ago. Pt reports pain worsened today. pt was seen at OBGYN office and sent to ED to R/O appendicitis. Pt denies any fever or chills. Denies any n/v/d

## 2020-08-14 NOTE — ED STATDOCS - PROGRESS NOTE DETAILS
Patient seen and evaluated in intake with ED Attending,  ED attending note and orders reviewed, will continue with patient follow up and care -Nimco Pastor PA-C GYN at bedside currently,  aware and will noemi woodall -Nimco Pastor PA-C pt aware of labs and imaging and GYN at bedside currently,  aware and will eval pt. -Nimco Pastor PA-C spoke to Dr. Hunt cleared from surgery stand point not appy spoke to Jamison Noonan she does not think it is ovarian torsion fu with her GYN she recommends. pt aware of plan. -Nimco Pastor PA-C

## 2020-08-14 NOTE — ED STATDOCS - PATIENT PORTAL LINK FT
You can access the FollowMyHealth Patient Portal offered by [facility] by registering at the following website: [email or web address]. By joining [employer]’s FollowMyHealth portal, you will also be able to view your health information using other applications (apps) compatible with our system.

## 2020-08-14 NOTE — CONSULT NOTE ADULT - SUBJECTIVE AND OBJECTIVE BOX
15.1   12.50 )-----------( 354      ( 14 Aug 2020 17:13 )             46.3   HUMAIRA NELSON is a 40y  who was sent to the ED after being seen in the office for rule out ovarian torsion. She stated that the pain began 3 days ago, as a dull, constant pain. The pain would worsen when she bends over, when she jumps or has any other increased activity. She stated that the pain improves when she does not have any movement. She stated that the pain is a 5/10 in severity, located in the right lower quadrant. She denies any nausea or vomiting. She stated that she had 5 BM in one day but they were not loose. She stated that the pain is relieved when she has a bowel movement and it is worse before a bowel movement. She denies chills or fever at home.   obhx:  c/s x4  c-hyst x1 2019, twin gestation  pmhx: none  pshx: c/s x5  meds: none  allergies: pnc    Vital Signs Last 24 Hrs  T(C): 37 (14 Aug 2020 16:34), Max: 37 (14 Aug 2020 16:34)  T(F): 98.6 (14 Aug 2020 16:34), Max: 98.6 (14 Aug 2020 16:34)  HR: 103 (14 Aug 2020 16:34) (103 - 103)  BP: 122/76 (14 Aug 2020 16:34) (122/76 - 122/76)  BP(mean): 89 (14 Aug 2020 16:34) (89 - 89)  RR: 20 (14 Aug 2020 16:34) (20 - 20)  SpO2: 95% (14 Aug 2020 16:34) (95% - 95%)  General: Alert and oriented x3, no acute distress  Abdominal: soft, non-tender to palpation, no masses appreciated on exam, no rebound tenderness, no distension, no rebound tenderness, no guarding                          15.1   12.50 )-----------( 354      ( 14 Aug 2020 17:13 )             46.3     08-14    137  |  105  |  13  ----------------------------<  106<H>  4.3   |  26  |  0.83    Ca    9.6      14 Aug 2020 17:13    TPro  8.6<H>  /  Alb  4.5  /  TBili  0.2  /  DBili  x   /  AST  29  /  ALT  56  /  AlkPhos  71  08-14    HCG Quantitative, Serum (20 @ 17:13)    HCG Quantitative, Serum: <1: HCG-Quantitative test interpretations: This test is intended only for the  detection & monitoring of pregnancy. For oncology studies order the tumor  marker test “HCG-TM” (hCG-Tumor Marker).  For pregnancy evaluation the reference values are as follows:  Negative:  <=4 mIU/mL  Indeterminate:  5 - 25 mIU/mL (suggest repeat testing in 72 hours)  Positive:  > 25 mIU/mL  Reference Values during Pregnancy:  Weeks after LMP* REFERENCE INTERVAL mIU/mL     3      6 - 71     4      10 - 750     5      220 - 7,100     6      160 - 32,000     7      3,700 - 164,000     8      32,000 - 150,000     9      64,000 - 151,000     10      47,000 - 187,000     12      28,000 - 211,000     14      14,000 - 63,000     15      12,000 - 71,000     16 - 18  8,000 - 58,000  * LMP:  Last Menstrual Period  HCG results of false positive and false negative for pregnancy are rare,  but can occur with this, and other, hCG tests. Gabrielle- and post-menopausal  females may have mildly elevated hCG concentrations usually less than 14  mIU/mL that are constant over time. mIU/mL    < from: CT Abdomen and Pelvis w/ Oral Cont and w/ IV Cont (20 @ 19:32) >  IMPRESSION:  1.  RIGHT ovarian cyst measuring 3.5 cm. Consider furtherevaluation with pelvic ultrasound.  2.  Right-sided Bartholin duct cyst measuring 2.9 cm extending down to the level of the vaginal introitus.  3.  Supracervical hysterectomy with fluid in the endocervical canal as well as just above the internal cervical os.  < end of copied text >    < from: US Transvaginal (20 @ 17:58) >  IMPRESSION:  Mildly enlarged right ovary secondary to a 3.7 cm cyst. Flow identified within the ovary at the time of exam. Intermittent torsion should be excluded on clinical grounds. Follow-up pelvic ultrasound in 6-8 weeks is recommended.  < end of copied text > HUMAIRA NELSON is a 40y  who was sent to the ED after being seen in her GYN office (Dr Mota) for rule out ovarian torsion. She stated that the pain began 3 days ago, as a dull, constant pain. The pain would worsen when she bends over, when she jumps or has any other increased activity. She stated that the pain improves when she does not have any movement. She stated that the pain is a 5/10 in severity, located in the right lower quadrant. She denies any nausea or vomiting. She stated that she had 5 BM in one day but they were not loose. She stated that the pain is relieved when she has a bowel movement and it is worse before a bowel movement. She denies chills or fever at home.     obhx:  c/s x4  c-hyst x1 2019, twin gestation  pmhx: none  pshx: c/s x5  meds: none  allergies: pnc    Vital Signs Last 24 Hrs  T(C): 37 (14 Aug 2020 16:34), Max: 37 (14 Aug 2020 16:34)  T(F): 98.6 (14 Aug 2020 16:34), Max: 98.6 (14 Aug 2020 16:34)  HR: 103 (14 Aug 2020 16:34) (103 - 103)  BP: 122/76 (14 Aug 2020 16:34) (122/76 - 122/76)  BP(mean): 89 (14 Aug 2020 16:34) (89 - 89)  RR: 20 (14 Aug 2020 16:34) (20 - 20)  SpO2: 95% (14 Aug 2020 16:34) (95% - 95%)  General: Alert and oriented x3, no acute distress  Abdominal: soft, non-tender to palpation, no masses appreciated on exam, no rebound tenderness, no distension, no rebound tenderness, no guarding                          15.1   12.50 )-----------( 354      ( 14 Aug 2020 17:13 )             46.3     08-14    137  |  105  |  13  ----------------------------<  106<H>  4.3   |  26  |  0.83    Ca    9.6      14 Aug 2020 17:13    TPro  8.6<H>  /  Alb  4.5  /  TBili  0.2  /  DBili  x   /  AST  29  /  ALT  56  /  AlkPhos  71  -    HCG Quantitative, Serum (20 @ 17:13)    HCG Quantitative, Serum: <1      < from: CT Abdomen and Pelvis w/ Oral Cont and w/ IV Cont (20 @ 19:32) >  IMPRESSION:  1.  RIGHT ovarian cyst measuring 3.5 cm. Consider further evaluation with pelvic ultrasound.  2.  Right-sided Bartholin duct cyst measuring 2.9 cm extending down to the level of the vaginal introitus.  3.  Supracervical hysterectomy with fluid in the endocervical canal as well as just above the internal cervical os.  < end of copied text >    < from: US Transvaginal (20 @ 17:58) >  IMPRESSION:  Mildly enlarged right ovary secondary to a 3.7 cm cyst. Flow identified within the ovary at the time of exam. Intermittent torsion should be excluded on clinical grounds. Follow-up pelvic ultrasound in 6-8 weeks is recommended.  < end of copied text >

## 2020-08-14 NOTE — ED STATDOCS - CLINICAL SUMMARY MEDICAL DECISION MAKING FREE TEXT BOX
RLQ pain x three days, will evaluate for appendicitis. RLQ pain x three days, will evaluate for appendicitis & rupture ovarian cyst

## 2020-08-14 NOTE — ED STATDOCS - ATTENDING CONTRIBUTION TO CARE
I, Abdullahi Rojas MD,  performed the initial face to face bedside interview with this patient regarding history of present illness, review of symptoms and relevant past medical, social and family history.  I completed an independent physical examination.  I was the initial provider who evaluated this patient. I have signed out the follow up of any pending tests (i.e. labs, radiological studies) to the ACP.  The ACP will complete the work up, interpret tests, administer medications if necessary and reassess the patient for an appropriate disposition.  If questions arise the ACP is expected to contact me for consultation. In the current work-flow I usually don't get to speak to nor reassess patients for final disposition once I sign the case out to the ACP (Unless otherwise documented).

## 2020-08-14 NOTE — ED STATDOCS - PHYSICAL EXAMINATION
*GEN: No acute distress, well appearing   *HEAD: Normocephalic, Atraumatic  *EYES/NOSE: b/l Pupils symmetric & Reactive to ligth, EOMI b/l  *THROAT: airway patent, moist mucous membranes  *NECK: Neck supple  *PULMONARY: No Respiratory distress, symmetric b/l chest rise  *CARDIAC: s1s2, regular rhythm   *ABDOMEN:  Non Distended, soft, no guarding, no rebound, no masses + RLQ TTP  *BACK: no CVA tenderness, No midline vertebral tenderness to palpation   *EXTREMITIES: symmetric pulses, 2+ DP & radial pulses, no cyanosis, no edema   *SKIN: no rash, no bruising   *NEUROLOGIC: alert,  full active & passive ROM in all 4 extremities,   *PSYCH: appropriate concern about symptoms, pleasant

## 2020-08-14 NOTE — ED STATDOCS - NSFOLLOWUPINSTRUCTIONS_ED_ALL_ED_FT
return to ed for any worsening abdominal pain may have early appendicitis    Abdominal Pain    Many things can cause abdominal pain. Many times, abdominal pain is not caused by a disease and will improve without treatment. Your health care provider will do a physical exam to determine if there is a dangerous cause of your pain; blood tests and imaging may help determine the cause of your pain. However, in many cases, no cause may be found and you may need further testing as an outpatient. Monitor your abdominal pain for any changes.     SEEK IMMEDIATE MEDICAL CARE IF YOU HAVE ANY OF THE FOLLOWING SYMPTOMS: worsening abdominal pain, uncontrollable vomiting, profuse diarrhea, inability to have bowel movements or pass gas, black or bloody stools, fever accompanying chest pain or back pain, or fainting. These symptoms may represent a serious problem that is an emergency. Do not wait to see if the symptoms will go away. Get medical help right away. Call 911 and do not drive yourself to the hospital. return to ed for any worsening abdominal pain since you may have early appendicitis    Abdominal Pain    Many things can cause abdominal pain. Many times, abdominal pain is not caused by a disease and will improve without treatment. Your health care provider will do a physical exam to determine if there is a dangerous cause of your pain; blood tests and imaging may help determine the cause of your pain. However, in many cases, no cause may be found and you may need further testing as an outpatient. Monitor your abdominal pain for any changes.     SEEK IMMEDIATE MEDICAL CARE IF YOU HAVE ANY OF THE FOLLOWING SYMPTOMS: worsening abdominal pain, uncontrollable vomiting, profuse diarrhea, inability to have bowel movements or pass gas, black or bloody stools, fever accompanying chest pain or back pain, or fainting. These symptoms may represent a serious problem that is an emergency. Do not wait to see if the symptoms will go away. Get medical help right away. Call 911 and do not drive yourself to the hospital.

## 2020-08-14 NOTE — CONSULT NOTE ADULT - SUBJECTIVE AND OBJECTIVE BOX
40y F  who was sent to the ED after being seen in the office for rule out ovarian torsion. She stated that the pain began 3 days ago, as a dull, constant pain. The pain would worsen when she bends over, when she jumps or has any other increased activity. She stated that the pain improves when she does not have any movement. She stated that the pain is a 5/10 in severity, located in the right lower quadrant. She denies any nausea or vomiting. She stated that she had 5 BM in one day but they were not loose. She stated that the pain is relieved when she has a bowel movement and it is worse before a bowel movement. She denies chills or fever at home.     ICU Vital Signs Last 24 Hrs  T(C): 37 (14 Aug 2020 16:34), Max: 37 (14 Aug 2020 16:34)  T(F): 98.6 (14 Aug 2020 16:34), Max: 98.6 (14 Aug 2020 16:34)  HR: 103 (14 Aug 2020 16:34) (103 - 103)  BP: 122/76 (14 Aug 2020 16:34) (122/76 - 122/76)  BP(mean): 89 (14 Aug 2020 16:34) (89 - 89)  ABP: --  ABP(mean): --  RR: 20 (14 Aug 2020 16:34) (20 - 20)  SpO2: 95% (14 Aug 2020 16:34) (95% - 95%)      · Physical Examination: *GEN: No acute distress, well appearing   *HEAD: Normocephalic, Atraumatic  *EYES/NOSE: b/l Pupils symmetric & Reactive to ligth, EOMI b/l  *THROAT: airway patent, moist mucous membranes  *NECK: Neck supple  *PULMONARY: No Respiratory distress, symmetric b/l chest rise  *CARDIAC: s1s2, regular rhythm   *ABDOMEN:  Non Distended, soft, no guarding, no rebound, no masses mild tenderness in RLQ.  *BACK: no CVA tenderness, No midline vertebral tenderness to palpation    	                          15.1   12.50 )-----------( 354      ( 14 Aug 2020 17:13 )             46.3   08-14    137  |  105  |  13  ----------------------------<  106<H>  4.3   |  26  |  0.83    Ca    9.6      14 Aug 2020 17:13    TPro  8.6<H>  /  Alb  4.5  /  TBili  0.2  /  DBili  x   /  AST  29  /  ALT  56  /  AlkPhos  71  08-14    Images:     EXAM:  CT ABDOMEN AND PELVIS OC IC                            PROCEDURE DATE:  08/14/2020          INTERPRETATION:  CLINICAL INFORMATION: RIGHT lower quadrant pain    COMPARISON: None. Study from 2014 not available.    PROCEDURE:  CT of the Abdomen and Pelvis was performed with intravenous contrast.  Intravenous contrast: 90 ml Omnipaque 350. 10 ml discarded.  Oral contrast: positive contrast was administered.  Sagittal and coronal reformats were performed.    FINDINGS:  LOWER CHEST: Within normal limits.    LIVER: Within normal limits.  BILE DUCTS: Normal caliber.  GALLBLADDER: Within normal limits.  SPLEEN: Within normal limits.  PANCREAS: Within normal limits.  ADRENALS: Within normal limits.  KIDNEYS/URETERS: Within normal limits.    BLADDER: Within normal limits.  REPRODUCTIVE ORGANS: There is a 2.9 x 2.9 x 2.2 cm right-sided Bartholin's duct cyst extending down to the level of the vaginal introitus.  Changes of supracervical hysterectomy. There is fluid in the endocervical canal as well as just above the internal cervical os. There is a RIGHT ovarian cyst measuring 3.5 x 3.3 x 3.4 cm. LEFT ovary appears unremarkable.    BOWEL: No bowel obstruction. Appendix is not visualized. No evidence of inflammation in the pericecal region.  PERITONEUM: No ascites.  VESSELS: Within normal limits.  RETROPERITONEUM/LYMPH NODES: No lymphadenopathy.  ABDOMINAL WALL: Within normal limits.  BONES: Within normal limits.    IMPRESSION:  1.  RIGHT ovarian cyst measuring 3.5 cm. Consider further evaluation with pelvic ultrasound.  2.  Right-sided Bartholin duct cyst measuring 2.9 cm extending down to the level of the vaginal introitus.  3.  Supracervical hysterectomy with fluid in the endocervical canal as well as just above the internal cervical os.

## 2020-08-14 NOTE — CONSULT NOTE ADULT - ASSESSMENT
39 Yo F came with RLQ abdominal pain for 3 days, CT scan showed  RIGHT ovarian cyst measuring 3.5 cm.    Plan:      - Clinically and radiologically there is no signs of acute appendicitis.  - No surgical intervention needed at this time.  - F/U Ob /Gyn recommendations.  - Pain control as needed.  - The patient can be discharged home.  - If pain become more worse, come back to the ED    The plan was discussed with Dr. Hunt

## 2020-08-14 NOTE — ED STATDOCS - CARE PROVIDER_API CALL
Johan Hunt  SURGERY  [address] [place] [address]  Phone: [phone]  Fax: [phone]  Follow Up Time:

## 2020-08-14 NOTE — ED ADULT TRIAGE NOTE - CHIEF COMPLAINT QUOTE
c/o right lower abdominal pain started 3 days ago, worse today, went to OB/GYN and sent to ER to r/o appendicitis

## 2020-08-14 NOTE — ED STATDOCS - NS ED ROS FT
Review of Systems:  	•	CONSTITUTIONAL: no fever  	•	SKIN: no rash  	•	RESPIRATORY: no shortness of breath  	•	CARDIAC: no chest pain, no palpitations  	•	GI:  +abd pain, nausea., no vomiting, no diarrhea  	•	GENITO-URINARY:  no dysuria; no hematuria    	•	MUSCULOSKELETAL:  no back pain  	•	NEUROLOGIC: no weakness  	•	ALLERGY: no rhinitis  	•	PSYSCHIATRIC: no anxiety

## 2020-08-14 NOTE — ED STATDOCS - OBJECTIVE STATEMENT
Pertinent HPI/PMH/PSH/FHx/SHx and Review of Systems contained within  HPI:  Patient p/w CC RLQ abd pain  x 3 days, worsening today associated with mild nausea. Pt states that she first noticed the pain when she was bending over and felt the pain. Denies vomiting, diarrhea, dysuria. Pt states that she was seen by her OBGYN today, was sent to the ED to r/o appendicitis. Pt states that she has not taken any medication for her pain.   PMH/PSH relevant for: s/p  x 5, s/p partial hysterectomy  ROS negative for: fever, Chest pain, SOB, Nausea, vomiting, diarrhea, dysuria    FamilyHx and SocialHx not otherwise contributory Pertinent HPI/PMH/PSH/FHx/SHx and Review of Systems contained within  HPI:  Patient p/w CC RLQ abd pain  x 3 days, worsening today associated with mild nausea. Pt states that she first noticed the pain when she was bending over or jumping up. Denies vomiting, diarrhea, dysuria. Pt states that she was seen by her OBGYN Dr Kurtz today, was sent to the ED to r/o appendicitis. Pt states that she has not taken any medication for her pain.   PMH/PSH relevant for: s/p  x 5, s/p partial hysterectomy  ROS negative for: fever, Chest pain, SOB, Nausea, vomiting, diarrhea, dysuria    FamilyHx and SocialHx not otherwise contributory

## 2020-08-14 NOTE — CONSULT NOTE ADULT - ASSESSMENT
Patient is a 40y  who presented to the ED for rule out torsion. History is not concerning for an ovarian torsion due to the nature and characteristics of the pain, physical exam exhibits that patient does not have an acute abdomen that does not require surgical intervention. Labs and imaging is also not concerning due to the size of the cyst.     - f/u with surgery recommendations, if surgery elects to take to the OR GYN will also observe the pelvis and look at the ovaries  - f/u outpatient with Dr. Kurtz  - return to ED if pain worsens    d/w Dr. Swift Patient is a 40y  who presented to the ED for rule out torsion. History is not concerning for an ovarian torsion due to the nature and characteristics of the pain, physical exam exhibits that patient does not have an acute abdomen that does not require surgical intervention. Labs and imaging is also not concerning due to the size of the cyst and flow visualized into right ovary.     - f/u with surgery recommendations, if surgery elects to take to the OR GYN will evaluate intraoperative findings and treatment as indicated  - f/u outpatient with Dr. Mota next week  - return to ED if pain worsens  - torsion precautions reviewed    d/w Dr. Swift

## 2020-08-14 NOTE — CONSULT NOTE ADULT - ATTENDING COMMENTS
Patient seen and evaluated at bedside. Agree with above assessment and exam. No evidence of acute abdomen and no indications for surgical intervention at this time.

## 2020-08-15 LAB
CULTURE RESULTS: SIGNIFICANT CHANGE UP
SPECIMEN SOURCE: SIGNIFICANT CHANGE UP

## 2020-08-18 ENCOUNTER — APPOINTMENT (OUTPATIENT)
Dept: ANTEPARTUM | Facility: CLINIC | Age: 40
End: 2020-08-18

## 2020-12-07 NOTE — CONSULT NOTE ADULT - REASON FOR ADMISSION
Patient Outreach Note    Pt given PCP follow up tele-health appt information.     Eulalio Hernandez RN  Ambulatory     12/7/2020, 13:56 EST       planned /hysterectomy

## 2021-03-08 ENCOUNTER — TRANSCRIPTION ENCOUNTER (OUTPATIENT)
Age: 41
End: 2021-03-08

## 2021-03-20 ENCOUNTER — EMERGENCY (EMERGENCY)
Facility: HOSPITAL | Age: 41
LOS: 0 days | Discharge: HOME | End: 2021-03-21
Attending: EMERGENCY MEDICINE
Payer: COMMERCIAL

## 2021-03-20 VITALS — HEIGHT: 60 IN | WEIGHT: 85.1 LBS

## 2021-03-20 DIAGNOSIS — Z79.891 LONG TERM (CURRENT) USE OF OPIATE ANALGESIC: ICD-10-CM

## 2021-03-20 DIAGNOSIS — R10.13 EPIGASTRIC PAIN: ICD-10-CM

## 2021-03-20 DIAGNOSIS — Z88.0 ALLERGY STATUS TO PENICILLIN: ICD-10-CM

## 2021-03-20 DIAGNOSIS — I45.10 UNSPECIFIED RIGHT BUNDLE-BRANCH BLOCK: ICD-10-CM

## 2021-03-20 DIAGNOSIS — R42 DIZZINESS AND GIDDINESS: ICD-10-CM

## 2021-03-20 DIAGNOSIS — I10 ESSENTIAL (PRIMARY) HYPERTENSION: ICD-10-CM

## 2021-03-20 LAB
BASOPHILS # BLD AUTO: 0.03 K/UL — SIGNIFICANT CHANGE UP (ref 0–0.2)
BASOPHILS NFR BLD AUTO: 0.3 % — SIGNIFICANT CHANGE UP (ref 0–2)
EOSINOPHIL # BLD AUTO: 0.05 K/UL — SIGNIFICANT CHANGE UP (ref 0–0.5)
EOSINOPHIL NFR BLD AUTO: 0.5 % — SIGNIFICANT CHANGE UP (ref 0–6)
HCT VFR BLD CALC: 40.2 % — SIGNIFICANT CHANGE UP (ref 34.5–45)
HGB BLD-MCNC: 13.7 G/DL — SIGNIFICANT CHANGE UP (ref 11.5–15.5)
IMM GRANULOCYTES NFR BLD AUTO: 0.2 % — SIGNIFICANT CHANGE UP (ref 0–1.5)
LYMPHOCYTES # BLD AUTO: 1.88 K/UL — SIGNIFICANT CHANGE UP (ref 1–3.3)
LYMPHOCYTES # BLD AUTO: 17.2 % — SIGNIFICANT CHANGE UP (ref 13–44)
MCHC RBC-ENTMCNC: 29.7 PG — SIGNIFICANT CHANGE UP (ref 27–34)
MCHC RBC-ENTMCNC: 34.1 GM/DL — SIGNIFICANT CHANGE UP (ref 32–36)
MCV RBC AUTO: 87 FL — SIGNIFICANT CHANGE UP (ref 80–100)
MONOCYTES # BLD AUTO: 0.76 K/UL — SIGNIFICANT CHANGE UP (ref 0–0.9)
MONOCYTES NFR BLD AUTO: 7 % — SIGNIFICANT CHANGE UP (ref 2–14)
NEUTROPHILS # BLD AUTO: 8.19 K/UL — HIGH (ref 1.8–7.4)
NEUTROPHILS NFR BLD AUTO: 74.8 % — SIGNIFICANT CHANGE UP (ref 43–77)
PLATELET # BLD AUTO: 303 K/UL — SIGNIFICANT CHANGE UP (ref 150–400)
RBC # BLD: 4.62 M/UL — SIGNIFICANT CHANGE UP (ref 3.8–5.2)
RBC # FLD: 12.3 % — SIGNIFICANT CHANGE UP (ref 10.3–14.5)
WBC # BLD: 10.93 K/UL — HIGH (ref 3.8–10.5)
WBC # FLD AUTO: 10.93 K/UL — HIGH (ref 3.8–10.5)

## 2021-03-20 PROCEDURE — 85025 COMPLETE CBC W/AUTO DIFF WBC: CPT

## 2021-03-20 PROCEDURE — 99284 EMERGENCY DEPT VISIT MOD MDM: CPT | Mod: 25

## 2021-03-20 PROCEDURE — 84702 CHORIONIC GONADOTROPIN TEST: CPT

## 2021-03-20 PROCEDURE — 93010 ELECTROCARDIOGRAM REPORT: CPT

## 2021-03-20 PROCEDURE — 83690 ASSAY OF LIPASE: CPT

## 2021-03-20 PROCEDURE — 99285 EMERGENCY DEPT VISIT HI MDM: CPT

## 2021-03-20 PROCEDURE — 80053 COMPREHEN METABOLIC PANEL: CPT

## 2021-03-20 PROCEDURE — 76705 ECHO EXAM OF ABDOMEN: CPT

## 2021-03-20 PROCEDURE — 36415 COLL VENOUS BLD VENIPUNCTURE: CPT

## 2021-03-20 PROCEDURE — 93005 ELECTROCARDIOGRAM TRACING: CPT

## 2021-03-20 RX ORDER — SODIUM CHLORIDE 9 MG/ML
1000 INJECTION INTRAMUSCULAR; INTRAVENOUS; SUBCUTANEOUS ONCE
Refills: 0 | Status: COMPLETED | OUTPATIENT
Start: 2021-03-20 | End: 2021-03-20

## 2021-03-20 RX ADMIN — SODIUM CHLORIDE 2000 MILLILITER(S): 9 INJECTION INTRAMUSCULAR; INTRAVENOUS; SUBCUTANEOUS at 23:34

## 2021-03-20 NOTE — ED ADULT TRIAGE NOTE - CHIEF COMPLAINT QUOTE
Pt presents to c/o of feeling flushed, ears pulsing and states her BP is 149/97.  Pt appears anxious at triage.   Denies headache, cp, SOB

## 2021-03-20 NOTE — ED ADULT NURSE NOTE - NSIMPLEMENTINTERV_GEN_ALL_ED
Implemented All Universal Safety Interventions:  [place] to call system. Call bell, personal items and telephone within reach. Instruct patient to call for assistance. Room bathroom lighting operational. Non-slip footwear when patient is off stretcher. Physically safe environment: no spills, clutter or unnecessary equipment. Stretcher in lowest position, wheels locked, appropriate side rails in place.

## 2021-03-20 NOTE — ED ADULT NURSE NOTE - CAS TRG GENERAL NORM CIRC DET
Strong peripheral pulses
Patient requests all Lab and Radiology Results on their Discharge Instructions

## 2021-03-20 NOTE — ED ADULT NURSE NOTE - OBJECTIVE STATEMENT
patient axox3, c/o hypertension at home. patient states her bp was 149/97 at home. patient also c/o epigastric pain. patient states she felt anxious in ED, states now mostly resolved. patient denies headache, n/v/d, fever, chills, cough, chest pain, SOB.

## 2021-03-21 VITALS
RESPIRATION RATE: 18 BRPM | TEMPERATURE: 98 F | HEART RATE: 78 BPM | SYSTOLIC BLOOD PRESSURE: 128 MMHG | DIASTOLIC BLOOD PRESSURE: 75 MMHG | OXYGEN SATURATION: 99 %

## 2021-03-21 LAB
ALBUMIN SERPL ELPH-MCNC: 4 G/DL — SIGNIFICANT CHANGE UP (ref 3.3–5)
ALP SERPL-CCNC: 47 U/L — SIGNIFICANT CHANGE UP (ref 40–120)
ALT FLD-CCNC: 31 U/L — SIGNIFICANT CHANGE UP (ref 12–78)
ANION GAP SERPL CALC-SCNC: 6 MMOL/L — SIGNIFICANT CHANGE UP (ref 5–17)
AST SERPL-CCNC: 15 U/L — SIGNIFICANT CHANGE UP (ref 15–37)
BILIRUB SERPL-MCNC: 0.3 MG/DL — SIGNIFICANT CHANGE UP (ref 0.2–1.2)
BUN SERPL-MCNC: 13 MG/DL — SIGNIFICANT CHANGE UP (ref 7–23)
CALCIUM SERPL-MCNC: 8.6 MG/DL — SIGNIFICANT CHANGE UP (ref 8.5–10.1)
CHLORIDE SERPL-SCNC: 106 MMOL/L — SIGNIFICANT CHANGE UP (ref 96–108)
CO2 SERPL-SCNC: 25 MMOL/L — SIGNIFICANT CHANGE UP (ref 22–31)
CREAT SERPL-MCNC: 0.62 MG/DL — SIGNIFICANT CHANGE UP (ref 0.5–1.3)
GLUCOSE SERPL-MCNC: 108 MG/DL — HIGH (ref 70–99)
HCG SERPL-ACNC: <1 MIU/ML — SIGNIFICANT CHANGE UP
LIDOCAIN IGE QN: 206 U/L — SIGNIFICANT CHANGE UP (ref 73–393)
POTASSIUM SERPL-MCNC: 3.7 MMOL/L — SIGNIFICANT CHANGE UP (ref 3.5–5.3)
POTASSIUM SERPL-SCNC: 3.7 MMOL/L — SIGNIFICANT CHANGE UP (ref 3.5–5.3)
PROT SERPL-MCNC: 7.6 GM/DL — SIGNIFICANT CHANGE UP (ref 6–8.3)
SODIUM SERPL-SCNC: 137 MMOL/L — SIGNIFICANT CHANGE UP (ref 135–145)

## 2021-03-21 PROCEDURE — 76705 ECHO EXAM OF ABDOMEN: CPT | Mod: 26

## 2021-03-21 RX ADMIN — SODIUM CHLORIDE 1000 MILLILITER(S): 9 INJECTION INTRAMUSCULAR; INTRAVENOUS; SUBCUTANEOUS at 00:04

## 2021-03-21 NOTE — ED PROVIDER NOTE - PHYSICAL EXAMINATION
Gen:  Well appearing, thin, anxious  Head:  NC/AT  Cardiac: S1S2, RRR  Abd: Soft,epigastric ttp, nd, no rebound  Resp: No distress, CTA   Ext: no deformities, no swelling  Skin: warm and dry as visualized

## 2021-03-21 NOTE — ED PROVIDER NOTE - OBJECTIVE STATEMENT
41 yo 41 yo female pw feeling facial flushing, ears ringing, dizziness and anxiety tonight. She states she took her bp and it was high at home sbp 180 after taking it several consecutive times.  Pt is also now experiencing epigastric pain since arriving to the er. She states she feels anxious

## 2021-03-21 NOTE — ED PROVIDER NOTE - PATIENT PORTAL LINK FT
You can access the FollowMyHealth Patient Portal offered by [facility] by registering at the following website: [email or web address]. By joining [employer]’s FollowMyHealth portal, you will also be able to view your health information using other applications (apps) compatible with our system.

## 2021-03-21 NOTE — ED PROVIDER NOTE - CARE PROVIDER_API CALL
Rickey Spencer J  CARDIOVASCULAR DISEASE  [address], [facility] [address]  [place] [address]  Phone: [phone]  Fax: [phone]  Follow Up Time:

## 2021-06-15 ENCOUNTER — ASOB RESULT (OUTPATIENT)
Age: 41
End: 2021-06-15

## 2021-06-15 ENCOUNTER — APPOINTMENT (OUTPATIENT)
Dept: ANTEPARTUM | Facility: CLINIC | Age: 41
End: 2021-06-15
Payer: COMMERCIAL

## 2021-06-15 PROCEDURE — 76830 TRANSVAGINAL US NON-OB: CPT

## 2021-06-15 PROCEDURE — 76856 US EXAM PELVIC COMPLETE: CPT | Mod: 59

## 2021-06-15 PROCEDURE — 99072 ADDL SUPL MATRL&STAF TM PHE: CPT

## 2022-03-08 NOTE — OB HISTORY
set-up required/supervision [Pregnancy History] : patient received anesthesia [___] : pregnancy complications occured [LMP: ___] : LMP: [unfilled] [DL: ___] : DL: [unfilled] [EGA: ___ wks] : EGA: [unfilled] wks [Spontaneous] : Spontaneous conception [Sonogram] : sonogram [at ___ wks] : at [unfilled] weeks [Definite:  ___ (Date)] : the last menstrual period was [unfilled] [Normal Amount/Duration] : was of a normal amount and duration [Regular Cycle Intervals] : periods have been regular [Frequency: Q ___ days] : menstrual periods occur approximately every [unfilled] days [Menstrual Cramps] : menstrual cramps [FreeTextEntry1] : Her prenatal records were not available for my review. \par \par She had a maternal fetal medicine consultation on January 7, 2019 to rule out a placenta accreta. The ultrasound examination at that time reported a low risk for placenta accreta. She was advised to consider having an MRI examination as an adjunct test to rule out a placenta accreta. She decided to not have the MRI examination\par \par She told me she was diagnosed with anemia during pregnancy. A hematocrit done on January 19, 2019 was 28.8%. [Spotting Between  Menses] : no spotting between menses [On BCP at conception] : the patient was not on BCP at conception

## 2022-11-14 ENCOUNTER — NON-APPOINTMENT (OUTPATIENT)
Age: 42
End: 2022-11-14

## 2023-01-14 ENCOUNTER — NON-APPOINTMENT (OUTPATIENT)
Age: 43
End: 2023-01-14

## 2023-05-02 NOTE — ED ADULT NURSE NOTE - CAS ELECT INFOMATION PROVIDED
Impression: Type 2 diab w mild nonprlf diabetic rtnop w/o macular edema, bilateral: T36.5203. Plan: Diabetes type II: mild background diabetic retinopathy, no signs of neovascularization noted. MAC-OCT ordered and reviewed with patient. No treatment necessary at this time. Patient was instructed to monitor vision for sudden changes and to call if visual changes noted. Discussed ocular and systemic benefits of blood sugar control. DC instructions

## 2024-02-25 ENCOUNTER — NON-APPOINTMENT (OUTPATIENT)
Age: 44
End: 2024-02-25

## 2024-03-23 ENCOUNTER — NON-APPOINTMENT (OUTPATIENT)
Age: 44
End: 2024-03-23

## 2024-12-02 ENCOUNTER — OFFICE (OUTPATIENT)
Dept: URBAN - METROPOLITAN AREA CLINIC 102 | Facility: CLINIC | Age: 44
Setting detail: OPHTHALMOLOGY
End: 2024-12-02
Payer: COMMERCIAL

## 2024-12-02 DIAGNOSIS — H40.011: ICD-10-CM

## 2024-12-02 DIAGNOSIS — H25.13: ICD-10-CM

## 2024-12-02 DIAGNOSIS — H40.012: ICD-10-CM

## 2024-12-02 DIAGNOSIS — H43.393: ICD-10-CM

## 2024-12-02 DIAGNOSIS — H40.013: ICD-10-CM

## 2024-12-02 PROCEDURE — 92133 CPTRZD OPH DX IMG PST SGM ON: CPT | Performed by: OPHTHALMOLOGY

## 2024-12-02 PROCEDURE — 92201 OPSCPY EXTND RTA DRAW UNI/BI: CPT | Performed by: OPHTHALMOLOGY

## 2024-12-02 PROCEDURE — 92014 COMPRE OPH EXAM EST PT 1/>: CPT | Performed by: OPHTHALMOLOGY

## 2024-12-02 PROCEDURE — 76514 ECHO EXAM OF EYE THICKNESS: CPT | Performed by: OPHTHALMOLOGY

## 2024-12-02 PROCEDURE — 92020 GONIOSCOPY: CPT | Performed by: OPHTHALMOLOGY

## 2024-12-02 PROCEDURE — 92083 EXTENDED VISUAL FIELD XM: CPT | Performed by: OPHTHALMOLOGY

## 2024-12-02 ASSESSMENT — REFRACTION_MANIFEST
OS_SPHERE: +0.50
OD_SPHERE: +0.25
OS_ADD: +1.50
OS_CYLINDER: -0.25
OD_AXIS: 089
OS_AXIS: 082
OD_ADD: +1.50
OD_CYLINDER: -0.25

## 2024-12-02 ASSESSMENT — REFRACTION_AUTOREFRACTION
OS_SPHERE: +1.00
OS_AXIS: 082
OD_SPHERE: +0.75
OD_CYLINDER: -0.25
OS_CYLINDER: -0.25
OD_AXIS: 089

## 2024-12-02 ASSESSMENT — CONFRONTATIONAL VISUAL FIELD TEST (CVF)
OD_FINDINGS: FULL
OS_FINDINGS: FULL

## 2024-12-02 ASSESSMENT — PACHYMETRY
OS_CT_UM: 553
OD_CT_CORRECTION: 0
OS_CT_CORRECTION: -1
OD_CT_UM: 549

## 2024-12-02 ASSESSMENT — VISUAL ACUITY
OD_BCVA: 20/20
OS_BCVA: 20/20

## 2024-12-02 ASSESSMENT — KERATOMETRY
OD_AXISANGLE_DEGREES: 024
METHOD_AUTO_MANUAL: AUTO
OD_K2POWER_DIOPTERS: 45.50
OS_AXISANGLE_DEGREES: 082
OS_K1POWER_DIOPTERS: 45.25
OS_K2POWER_DIOPTERS: 45.50
OD_K1POWER_DIOPTERS: 45.00

## 2024-12-02 ASSESSMENT — TONOMETRY
OS_IOP_MMHG: 20
OD_IOP_MMHG: 21

## 2025-07-30 NOTE — ED STATDOCS - CPE ED GASTRO NORM
Dad calling to see if paperwork for college is completed and ready for ?    [place]- [phone]    normal...